# Patient Record
Sex: FEMALE | Race: WHITE | NOT HISPANIC OR LATINO | Employment: UNEMPLOYED | URBAN - METROPOLITAN AREA
[De-identification: names, ages, dates, MRNs, and addresses within clinical notes are randomized per-mention and may not be internally consistent; named-entity substitution may affect disease eponyms.]

---

## 2019-10-15 ENCOUNTER — OFFICE VISIT (OUTPATIENT)
Dept: INTERNAL MEDICINE | Facility: CLINIC | Age: 59
End: 2019-10-15

## 2019-10-15 VITALS
DIASTOLIC BLOOD PRESSURE: 104 MMHG | HEART RATE: 87 BPM | BODY MASS INDEX: 23.22 KG/M2 | SYSTOLIC BLOOD PRESSURE: 180 MMHG | RESPIRATION RATE: 16 BRPM | OXYGEN SATURATION: 98 % | HEIGHT: 61 IN | WEIGHT: 123 LBS

## 2019-10-15 DIAGNOSIS — Z11.59 ENCOUNTER FOR HEPATITIS C SCREENING TEST FOR LOW RISK PATIENT: ICD-10-CM

## 2019-10-15 DIAGNOSIS — C50.919 MALIGNANT NEOPLASM OF BREAST IN FEMALE, ESTROGEN RECEPTOR NEGATIVE, UNSPECIFIED LATERALITY, UNSPECIFIED SITE OF BREAST (HCC): ICD-10-CM

## 2019-10-15 DIAGNOSIS — Z17.1 MALIGNANT NEOPLASM OF BREAST IN FEMALE, ESTROGEN RECEPTOR NEGATIVE, UNSPECIFIED LATERALITY, UNSPECIFIED SITE OF BREAST (HCC): ICD-10-CM

## 2019-10-15 DIAGNOSIS — R39.11 URINARY HESITANCY: Primary | ICD-10-CM

## 2019-10-15 DIAGNOSIS — R03.0 WHITE COAT SYNDROME WITHOUT DIAGNOSIS OF HYPERTENSION: ICD-10-CM

## 2019-10-15 LAB
ALBUMIN SERPL-MCNC: 5 G/DL (ref 3.5–5.2)
ALBUMIN/GLOB SERPL: 1.7 G/DL
ALP SERPL-CCNC: 101 U/L (ref 39–117)
ALT SERPL W P-5'-P-CCNC: 28 U/L (ref 1–33)
ANION GAP SERPL CALCULATED.3IONS-SCNC: 17.1 MMOL/L (ref 5–15)
AST SERPL-CCNC: 19 U/L (ref 1–32)
BILIRUB BLD-MCNC: NEGATIVE MG/DL
BILIRUB SERPL-MCNC: 0.5 MG/DL (ref 0.2–1.2)
BUN BLD-MCNC: 14 MG/DL (ref 8–23)
BUN/CREAT SERPL: 19.4 (ref 7–25)
CALCIUM SPEC-SCNC: 9.9 MG/DL (ref 8.6–10.5)
CHLORIDE SERPL-SCNC: 100 MMOL/L (ref 98–107)
CLARITY, POC: CLEAR
CO2 SERPL-SCNC: 25.9 MMOL/L (ref 22–29)
COLOR UR: YELLOW
CREAT BLD-MCNC: 0.72 MG/DL (ref 0.57–1)
GFR SERPL CREATININE-BSD FRML MDRD: 82 ML/MIN/1.73
GLOBULIN UR ELPH-MCNC: 2.9 GM/DL
GLUCOSE BLD-MCNC: 91 MG/DL (ref 65–99)
GLUCOSE UR STRIP-MCNC: NEGATIVE MG/DL
HCV AB SER DONR QL: NORMAL
KETONES UR QL: NEGATIVE
LEUKOCYTE EST, POC: NEGATIVE
NITRITE UR-MCNC: NEGATIVE MG/ML
PH UR: 5 [PH] (ref 5–8)
POTASSIUM BLD-SCNC: 4.3 MMOL/L (ref 3.5–5.2)
PROT SERPL-MCNC: 7.9 G/DL (ref 6–8.5)
PROT UR STRIP-MCNC: NEGATIVE MG/DL
RBC # UR STRIP: NEGATIVE /UL
SODIUM BLD-SCNC: 143 MMOL/L (ref 136–145)
SP GR UR: 1.01 (ref 1–1.03)
UROBILINOGEN UR QL: NORMAL

## 2019-10-15 PROCEDURE — 86803 HEPATITIS C AB TEST: CPT | Performed by: INTERNAL MEDICINE

## 2019-10-15 PROCEDURE — 81003 URINALYSIS AUTO W/O SCOPE: CPT | Performed by: INTERNAL MEDICINE

## 2019-10-15 PROCEDURE — 99204 OFFICE O/P NEW MOD 45 MIN: CPT | Performed by: INTERNAL MEDICINE

## 2019-10-15 PROCEDURE — 80053 COMPREHEN METABOLIC PANEL: CPT | Performed by: INTERNAL MEDICINE

## 2019-10-15 RX ORDER — INFLUENZA A VIRUS A/MICHIGAN/45/2015 (H1N1) RECOMBINANT HEMAGGLUTININ ANTIGEN, INFLUENZA A VIRUS A/SINGAPORE/INFIMH-16-0019/2016 (H3N2) RECOMBINANT HEMAGGLUTININ ANTIGEN, INFLUENZA B VIRUS B/MARYLAND/15/2016 RECOMBINANT HEMAGGLUTININ ANTIGEN, AND INFLUENZA B VIRUS B/PHUKET/3073/2013 RECOMBINANT HEMAGGLUTININ ANTIGEN 45; 45; 45; 45 UG/.5ML; UG/.5ML; UG/.5ML; UG/.5ML
INJECTION INTRAMUSCULAR
COMMUNITY
Start: 2019-10-09 | End: 2019-10-15

## 2019-10-15 NOTE — PROGRESS NOTES
"Internal Medicine New Patient  Annie Oseguera is a 59 y.o. female who presents today to establish care and with concerns as outlined below.    Chief Complaint  Chief Complaint   Patient presents with   • Establish Care   • Difficulty Urinating     x 1month, no pain no burning        HPI  Ms. Oseguera presents today to establish care. She reports previously being seen by Mary Beth Muñiz at , had labs about a year ago.    Triple negative breast cancer in 2018 with lumpectomy, chemotherapy and radiation. She follows with Dr. Brandt at . Has lymphedema in her left hand. Has been taught exercises to manage this. Also has mild neuropathy in her left foot.    She notes previously that she has had normal blood pressure. About 6 months ago she was at her radiation appt and her blood pressure was elevated there. Also was elevated when at Dr. Brandt office. Following this she monitored her blood pressure at home for a week and it was always 120/70. She denies chest pain, vision changes, lightheadedness, dizziness, SOA.    She notes 1 month of urinary difficulty. She describes it as a \"catch\" upon initiation of urination. She has to push a slight bit more than previously but once she starts she denies weak stream, incomplete emptying. Does report lack of sensation that she needs to urinate at night but does have nocturia. Denies this being an issue during the day. No dysuria, back pain, hematuria, urinary frequency, incontinence, urgency. Reports adequate hydration. Has a history of prolapse bladder that was previously evaluated after her hysterectomy in early 2000s and she opted to defer surgery at that time.         Review of Systems  Review of Systems   Constitutional: Negative.    HENT: Negative.    Eyes: Negative.    Respiratory: Negative.    Cardiovascular: Negative.    Gastrointestinal: Negative.    Genitourinary: Positive for difficulty urinating and urinary incontinence (chronic stress incontinence). Negative for " "decreased urine volume, dysuria, flank pain, frequency, hematuria, pelvic pain, pelvic pressure and urgency.   Musculoskeletal: Negative.    Skin: Negative.    Neurological: Negative.    Hematological:        Lymphedema     Psychiatric/Behavioral: Negative.         Past Medical History  Past Medical History:   Diagnosis Date   • Cancer (CMS/HCC)     breast        Surgical History  Past Surgical History:   Procedure Laterality Date   • BREAST LUMPECTOMY Left 2018   • BREAST SURGERY      biliateral   • HYSTERECTOMY          Family History  Family History   Problem Relation Age of Onset   • Breast cancer Mother 55   • Breast cancer Maternal Great-Grandmother 55   • Uterine cancer Half-Sister 73        related via dad        Social History  Social History     Socioeconomic History   • Marital status:      Spouse name: Not on file   • Number of children: Not on file   • Years of education: Not on file   • Highest education level: Not on file   Tobacco Use   • Smoking status: Former Smoker     Packs/day: 0.50     Years: 1.00     Pack years: 0.50     Types: Cigarettes     Last attempt to quit:      Years since quittin.8   • Smokeless tobacco: Never Used   Substance and Sexual Activity   • Alcohol use: Yes     Alcohol/week: 8.4 oz     Types: 14 Glasses of wine per week     Comment: 2 nightly   • Drug use: No   • Sexual activity: Yes        Current Medications  Current Outpatient Medications on File Prior to Visit   Medication Sig Dispense Refill   • [DISCONTINUED] FLUBLOK QUADRIVALENT 0.5 ML solution prefilled syringe IM suspension        No current facility-administered medications on file prior to visit.        Allergies  Allergies   Allergen Reactions   • Sulfa Antibiotics Shortness Of Breath and Rash   • Penicillins Hives        Objective  Visit Vitals  BP (!) 180/104   Pulse 87   Resp 16   Ht 154.9 cm (61\")   Wt 55.8 kg (123 lb)   SpO2 98%   BMI 23.24 kg/m²        Physical Exam  Physical Exam "   Constitutional: She is oriented to person, place, and time. She appears well-developed and well-nourished. No distress.   HENT:   Head: Normocephalic and atraumatic.   Right Ear: External ear normal.   Left Ear: External ear normal.   Nose: Nose normal.   Mouth/Throat: Oropharynx is clear and moist. No oropharyngeal exudate.   Eyes: Conjunctivae and EOM are normal. Pupils are equal, round, and reactive to light. No scleral icterus.   Neck: Neck supple. No thyromegaly present.   Cardiovascular: Normal rate, regular rhythm, normal heart sounds and intact distal pulses.   No murmur heard.  Pulmonary/Chest: Effort normal and breath sounds normal. No respiratory distress.   Abdominal: Soft. Bowel sounds are normal. She exhibits no distension. There is no tenderness. There is no CVA tenderness.   Musculoskeletal: She exhibits edema (minimal edema of LUE). She exhibits no deformity.   Lymphadenopathy:     She has no cervical adenopathy.   Neurological: She is alert and oriented to person, place, and time. She has normal strength. No cranial nerve deficit or sensory deficit. She exhibits normal muscle tone.   Skin: Skin is warm and dry. No rash noted. She is not diaphoretic.   Psychiatric: She has a normal mood and affect. Her behavior is normal. Judgment and thought content normal.   Nursing note and vitals reviewed.       Results  No results found for this or any previous visit.     Assessment and Plan  Annie was seen today for establish care and difficulty urinating.    Diagnoses and all orders for this visit:    Urinary hesitancy  - Difficulty initiating urination without associated retention, dysuria, frequency, hesitancy. Has chronic stress incontinence.  - No signs or symptoms to suggest neurologic etiology  - UA normal  - Has history of organ prolapse after hysterectomy that has not been surgically corrected, suspect this as possible cause of her urinary difficulty  - Will obtain CMP to check kidney function  -  Referral placed to urology for further evaluation and possible surgical fixation of prolapse if felt to be contributing    Malignant neoplasm of breast in female, estrogen receptor negative, unspecified laterality, unspecified site of breast (CMS/HCC)  - s/p lumpectomy, chemotherapy and radiation  - Follows with Dr. Mallory Brandt at     White coat syndrome without diagnosis of hypertension  - BP elevated in office today, also in oncologist and radiation medicine physician office as well per patient  - BP when checked at home consistently 120/70  - She notes feeling anxious but denies chest pain, blurred vision, headache, SOA  - Advised her to continue to monitor at home and call the office if consistently above 160/100    Encounter for hepatitis C screening test for low risk patient  - HCV ab ordered    Health Maintenance   Topic Date Due   • MAMMOGRAM  1960   • ANNUAL PHYSICAL  09/30/1961   • TDAP/TD VACCINES (1 - Tdap) 09/30/1977   • ZOSTER VACCINE (1 of 2) 09/30/2008   • HEPATITIS C SCREENING  10/15/2019   • PAP SMEAR  10/15/2019   • COLONOSCOPY  10/15/2019   • INFLUENZA VACCINE  Completed     Health Maintenance  - Pap smear: s/p hysterectomy for fibroids. Reports normal pap smear 2017 by Dr. Pathak. Records requested.  - Mammogram: Last 1/2019, BIRADS 1.  - Colonoscopy: Last 2009, normal per patient report. Have requested records. If no polyps will proceed with cologuard at next visit per patient preference.  - Immunizations: Flu UTD. Unsure of tdap. Has completed hep A series. Counseled on shingrix today, she will call her insurance to determine coverage.  - Depression screening: Negative 10/2019    Reports recent screening labs at . Will request these records, deferred screening labs today.    Return in about 3 months (around 1/15/2020) for Follow up urinary difficulty.    Addendum:  Requested record of prior colonoscopy from [facility] but no colonoscopy found.   Requested record of pap smear from  Dr. Pathak's office but noted to have not been seen since 2016 and only records sent were from 2012 regarding her hysterectomy.

## 2020-02-11 ENCOUNTER — OFFICE VISIT (OUTPATIENT)
Dept: INTERNAL MEDICINE | Facility: CLINIC | Age: 60
End: 2020-02-11

## 2020-02-11 VITALS
DIASTOLIC BLOOD PRESSURE: 94 MMHG | BODY MASS INDEX: 22.84 KG/M2 | HEIGHT: 61 IN | HEART RATE: 68 BPM | WEIGHT: 121 LBS | SYSTOLIC BLOOD PRESSURE: 154 MMHG

## 2020-02-11 DIAGNOSIS — I10 ESSENTIAL HYPERTENSION: ICD-10-CM

## 2020-02-11 DIAGNOSIS — Z12.11 SCREEN FOR COLON CANCER: Primary | ICD-10-CM

## 2020-02-11 PROBLEM — R03.0 WHITE COAT SYNDROME WITHOUT DIAGNOSIS OF HYPERTENSION: Status: RESOLVED | Noted: 2019-10-15 | Resolved: 2020-02-11

## 2020-02-11 LAB
ALBUMIN SERPL-MCNC: 4.1 G/DL (ref 3.5–5.2)
ALBUMIN/GLOB SERPL: 1.3 G/DL
ALP SERPL-CCNC: 141 U/L (ref 39–117)
ALT SERPL W P-5'-P-CCNC: 96 U/L (ref 1–33)
ANION GAP SERPL CALCULATED.3IONS-SCNC: 13.3 MMOL/L (ref 5–15)
AST SERPL-CCNC: 37 U/L (ref 1–32)
BILIRUB SERPL-MCNC: 0.4 MG/DL (ref 0.2–1.2)
BUN BLD-MCNC: 13 MG/DL (ref 8–23)
BUN/CREAT SERPL: 15.5 (ref 7–25)
CALCIUM SPEC-SCNC: 9.7 MG/DL (ref 8.6–10.5)
CHLORIDE SERPL-SCNC: 102 MMOL/L (ref 98–107)
CHOLEST SERPL-MCNC: 228 MG/DL (ref 0–200)
CO2 SERPL-SCNC: 24.7 MMOL/L (ref 22–29)
CREAT BLD-MCNC: 0.84 MG/DL (ref 0.57–1)
DEPRECATED RDW RBC AUTO: 42.8 FL (ref 37–54)
ERYTHROCYTE [DISTWIDTH] IN BLOOD BY AUTOMATED COUNT: 12.6 % (ref 12.3–15.4)
GFR SERPL CREATININE-BSD FRML MDRD: 69 ML/MIN/1.73
GLOBULIN UR ELPH-MCNC: 3.1 GM/DL
GLUCOSE BLD-MCNC: 101 MG/DL (ref 65–99)
HCT VFR BLD AUTO: 43 % (ref 34–46.6)
HDLC SERPL-MCNC: 81 MG/DL (ref 40–60)
HGB BLD-MCNC: 14.4 G/DL (ref 12–15.9)
LDLC SERPL CALC-MCNC: 131 MG/DL (ref 0–100)
LDLC/HDLC SERPL: 1.61 {RATIO}
MCH RBC QN AUTO: 31.4 PG (ref 26.6–33)
MCHC RBC AUTO-ENTMCNC: 33.5 G/DL (ref 31.5–35.7)
MCV RBC AUTO: 93.7 FL (ref 79–97)
PLATELET # BLD AUTO: 258 10*3/MM3 (ref 140–450)
PMV BLD AUTO: 9.5 FL (ref 6–12)
POTASSIUM BLD-SCNC: 5.4 MMOL/L (ref 3.5–5.2)
PROT SERPL-MCNC: 7.2 G/DL (ref 6–8.5)
RBC # BLD AUTO: 4.59 10*6/MM3 (ref 3.77–5.28)
SODIUM BLD-SCNC: 140 MMOL/L (ref 136–145)
TRIGL SERPL-MCNC: 82 MG/DL (ref 0–150)
TSH SERPL DL<=0.05 MIU/L-ACNC: 5.12 UIU/ML (ref 0.27–4.2)
VLDLC SERPL-MCNC: 16.4 MG/DL (ref 5–40)
WBC NRBC COR # BLD: 6.51 10*3/MM3 (ref 3.4–10.8)

## 2020-02-11 PROCEDURE — 80061 LIPID PANEL: CPT | Performed by: INTERNAL MEDICINE

## 2020-02-11 PROCEDURE — 99213 OFFICE O/P EST LOW 20 MIN: CPT | Performed by: INTERNAL MEDICINE

## 2020-02-11 PROCEDURE — 85027 COMPLETE CBC AUTOMATED: CPT | Performed by: INTERNAL MEDICINE

## 2020-02-11 PROCEDURE — 84443 ASSAY THYROID STIM HORMONE: CPT | Performed by: INTERNAL MEDICINE

## 2020-02-11 PROCEDURE — 80053 COMPREHEN METABOLIC PANEL: CPT | Performed by: INTERNAL MEDICINE

## 2020-02-11 RX ORDER — LISINOPRIL 10 MG/1
10 TABLET ORAL NIGHTLY
Qty: 30 TABLET | Refills: 5 | Status: SHIPPED | OUTPATIENT
Start: 2020-02-11 | End: 2021-02-04

## 2020-03-09 PROBLEM — K57.30 SIGMOID DIVERTICULOSIS: Status: ACTIVE | Noted: 2020-03-09

## 2020-08-12 ENCOUNTER — OFFICE VISIT (OUTPATIENT)
Dept: INTERNAL MEDICINE | Facility: CLINIC | Age: 60
End: 2020-08-12

## 2020-08-12 ENCOUNTER — LAB (OUTPATIENT)
Dept: LAB | Facility: HOSPITAL | Age: 60
End: 2020-08-12

## 2020-08-12 VITALS
HEIGHT: 61 IN | DIASTOLIC BLOOD PRESSURE: 76 MMHG | HEART RATE: 76 BPM | WEIGHT: 124 LBS | SYSTOLIC BLOOD PRESSURE: 120 MMHG | BODY MASS INDEX: 23.41 KG/M2 | TEMPERATURE: 96.6 F

## 2020-08-12 DIAGNOSIS — K57.30 SIGMOID DIVERTICULOSIS: ICD-10-CM

## 2020-08-12 DIAGNOSIS — I10 ESSENTIAL HYPERTENSION: Primary | ICD-10-CM

## 2020-08-12 LAB
ALBUMIN SERPL-MCNC: 4.4 G/DL (ref 3.5–5.2)
ALBUMIN/GLOB SERPL: 1.8 G/DL
ALP SERPL-CCNC: 84 U/L (ref 39–117)
ALT SERPL W P-5'-P-CCNC: 26 U/L (ref 1–33)
ANION GAP SERPL CALCULATED.3IONS-SCNC: 13.4 MMOL/L (ref 5–15)
AST SERPL-CCNC: 22 U/L (ref 1–32)
BILIRUB SERPL-MCNC: 0.3 MG/DL (ref 0–1.2)
BUN SERPL-MCNC: 19 MG/DL (ref 8–23)
BUN/CREAT SERPL: 22.4 (ref 7–25)
CALCIUM SPEC-SCNC: 10.2 MG/DL (ref 8.6–10.5)
CHLORIDE SERPL-SCNC: 103 MMOL/L (ref 98–107)
CO2 SERPL-SCNC: 26.6 MMOL/L (ref 22–29)
CREAT SERPL-MCNC: 0.85 MG/DL (ref 0.57–1)
DEPRECATED RDW RBC AUTO: 47.6 FL (ref 37–54)
ERYTHROCYTE [DISTWIDTH] IN BLOOD BY AUTOMATED COUNT: 13.8 % (ref 12.3–15.4)
GFR SERPL CREATININE-BSD FRML MDRD: 68 ML/MIN/1.73
GLOBULIN UR ELPH-MCNC: 2.4 GM/DL
GLUCOSE SERPL-MCNC: 102 MG/DL (ref 65–99)
HCT VFR BLD AUTO: 43.8 % (ref 34–46.6)
HGB BLD-MCNC: 15 G/DL (ref 12–15.9)
MCH RBC QN AUTO: 32.2 PG (ref 26.6–33)
MCHC RBC AUTO-ENTMCNC: 34.2 G/DL (ref 31.5–35.7)
MCV RBC AUTO: 94 FL (ref 79–97)
PLATELET # BLD AUTO: 272 10*3/MM3 (ref 140–450)
PMV BLD AUTO: 9.3 FL (ref 6–12)
POTASSIUM SERPL-SCNC: 3.9 MMOL/L (ref 3.5–5.2)
PROT SERPL-MCNC: 6.8 G/DL (ref 6–8.5)
RBC # BLD AUTO: 4.66 10*6/MM3 (ref 3.77–5.28)
SODIUM SERPL-SCNC: 143 MMOL/L (ref 136–145)
WBC # BLD AUTO: 7.39 10*3/MM3 (ref 3.4–10.8)

## 2020-08-12 PROCEDURE — 80053 COMPREHEN METABOLIC PANEL: CPT | Performed by: INTERNAL MEDICINE

## 2020-08-12 PROCEDURE — 85027 COMPLETE CBC AUTOMATED: CPT | Performed by: INTERNAL MEDICINE

## 2020-08-12 PROCEDURE — 99213 OFFICE O/P EST LOW 20 MIN: CPT | Performed by: INTERNAL MEDICINE

## 2020-08-12 NOTE — PROGRESS NOTES
Patient is a 59 y.o. female who is here for a follow up of hypertension.  Chief Complaint   Patient presents with   • Hypertension         HPI:    Here for mgmt of HTN.  Last visit was started on lisinopril.  No cough or SOB or edema.  BP readings are good at home.  Exercising without any issues.      History:     Patient Active Problem List   Diagnosis   • Urinary hesitancy   • Malignant neoplasm of breast in female, estrogen receptor negative (CMS/HCC)   • Essential hypertension   • Sigmoid diverticulosis       Past Medical History:   Diagnosis Date   • Cancer (CMS/HCC)     breast       Past Surgical History:   Procedure Laterality Date   • BREAST LUMPECTOMY Left    • BREAST SURGERY      biliateral   • HYSTERECTOMY         Current Outpatient Medications on File Prior to Visit   Medication Sig   • lisinopril (PRINIVIL,ZESTRIL) 10 MG tablet Take 1 tablet by mouth Every Night.     No current facility-administered medications on file prior to visit.        Family History   Problem Relation Age of Onset   • Breast cancer Mother 55   • Breast cancer Maternal Great-Grandmother 55   • Uterine cancer Half-Sister 73        related via dad       Social History     Socioeconomic History   • Marital status:      Spouse name: Not on file   • Number of children: Not on file   • Years of education: Not on file   • Highest education level: Not on file   Tobacco Use   • Smoking status: Former Smoker     Packs/day: 0.50     Years: 1.00     Pack years: 0.50     Types: Cigarettes     Last attempt to quit:      Years since quittin.6   • Smokeless tobacco: Never Used   Substance and Sexual Activity   • Alcohol use: Yes     Alcohol/week: 14.0 standard drinks     Types: 14 Glasses of wine per week     Comment: 2 nightly   • Drug use: No   • Sexual activity: Yes         Review of Systems   Constitutional: Negative for chills, fever and unexpected weight change.   HENT: Negative for congestion, ear pain, hearing  "loss, rhinorrhea, sinus pressure, sore throat and trouble swallowing.    Eyes: Negative for discharge and itching.   Respiratory: Negative for cough, chest tightness and shortness of breath.    Cardiovascular: Negative for chest pain, palpitations and leg swelling.   Gastrointestinal: Negative for abdominal pain, blood in stool, constipation, diarrhea and vomiting.        3/20 colonoscopy with Dr Roman, no polyps   Endocrine: Negative for polydipsia and polyuria.   Genitourinary: Negative for dysuria, enuresis, frequency, hematuria and urgency.   Musculoskeletal: Negative for arthralgias, back pain, gait problem and joint swelling.   Skin: Negative for rash and wound.   Allergic/Immunologic: Negative for immunocompromised state.   Neurological: Negative for dizziness, syncope, weakness, light-headedness, numbness and headaches.   Hematological: Does not bruise/bleed easily.   Psychiatric/Behavioral: Negative for behavioral problems, dysphoric mood and sleep disturbance. The patient is not nervous/anxious.        /76   Pulse 76   Temp 96.6 °F (35.9 °C) (Infrared)   Ht 154.9 cm (60.98\")   Wt 56.2 kg (124 lb)   BMI 23.44 kg/m²       Physical Exam   Constitutional: She is oriented to person, place, and time. She appears well-developed and well-nourished.   HENT:   Head: Normocephalic and atraumatic.   Right Ear: External ear normal.   Left Ear: External ear normal.   Mouth/Throat: Oropharynx is clear and moist.   Eyes: Conjunctivae and EOM are normal.   Neck: Normal range of motion. Neck supple.   Cardiovascular: Normal rate, regular rhythm and normal heart sounds.   Pulmonary/Chest: Effort normal and breath sounds normal.   Abdominal: Soft. Bowel sounds are normal.   Musculoskeletal: Normal range of motion.   Lymphadenopathy:     She has no cervical adenopathy.   Neurological: She is alert and oriented to person, place, and time.   Skin: Skin is warm and dry.   Psychiatric: She has a normal mood and affect. " Her behavior is normal. Thought content normal.       Procedure:      Discussion/Summary:    HTN-cont lisinopril at 1/2 dose  Diverticulosis-counseled on fiber   High risk meds-labs today dw patient    8/12 labs noted and dw patient    Current Outpatient Medications:   •  lisinopril (PRINIVIL,ZESTRIL) 10 MG tablet, Take 1 tablet by mouth Every Night., Disp: 30 tablet, Rfl: 5        Annie was seen today for hypertension.    Diagnoses and all orders for this visit:    Essential hypertension  -     CBC (No Diff)  -     Comprehensive Metabolic Panel    Sigmoid diverticulosis

## 2021-02-04 DIAGNOSIS — I10 ESSENTIAL HYPERTENSION: ICD-10-CM

## 2021-02-04 RX ORDER — LISINOPRIL 10 MG/1
TABLET ORAL
Qty: 90 TABLET | Refills: 3 | Status: SHIPPED | OUTPATIENT
Start: 2021-02-04 | End: 2022-02-14 | Stop reason: SDUPTHER

## 2022-02-14 ENCOUNTER — OFFICE VISIT (OUTPATIENT)
Dept: INTERNAL MEDICINE | Facility: CLINIC | Age: 62
End: 2022-02-14

## 2022-02-14 VITALS
TEMPERATURE: 96.9 F | BODY MASS INDEX: 24.73 KG/M2 | HEART RATE: 80 BPM | OXYGEN SATURATION: 99 % | WEIGHT: 131 LBS | HEIGHT: 61 IN | SYSTOLIC BLOOD PRESSURE: 136 MMHG | DIASTOLIC BLOOD PRESSURE: 70 MMHG

## 2022-02-14 DIAGNOSIS — K21.9 GASTROESOPHAGEAL REFLUX DISEASE WITHOUT ESOPHAGITIS: Primary | ICD-10-CM

## 2022-02-14 DIAGNOSIS — I10 ESSENTIAL HYPERTENSION: ICD-10-CM

## 2022-02-14 PROCEDURE — 99214 OFFICE O/P EST MOD 30 MIN: CPT | Performed by: INTERNAL MEDICINE

## 2022-02-14 RX ORDER — ESTRADIOL 10 UG/1
1 INSERT VAGINAL 2 TIMES WEEKLY
COMMUNITY
Start: 2021-12-20 | End: 2022-08-24 | Stop reason: ALTCHOICE

## 2022-02-14 RX ORDER — LISINOPRIL 10 MG/1
5 TABLET ORAL DAILY
Qty: 45 TABLET | Refills: 1 | Status: SHIPPED | OUTPATIENT
Start: 2022-02-14 | End: 2022-08-16

## 2022-02-14 RX ORDER — OMEPRAZOLE 20 MG/1
20 CAPSULE, DELAYED RELEASE ORAL DAILY
COMMUNITY
End: 2022-02-14 | Stop reason: SDUPTHER

## 2022-02-14 RX ORDER — VITAMIN B COMPLEX
1 CAPSULE ORAL DAILY
COMMUNITY

## 2022-02-14 RX ORDER — OMEPRAZOLE 20 MG/1
20 CAPSULE, DELAYED RELEASE ORAL DAILY
Qty: 90 CAPSULE | Refills: 1 | Status: SHIPPED | OUTPATIENT
Start: 2022-02-14 | End: 2022-08-24 | Stop reason: SDUPTHER

## 2022-02-14 RX ORDER — CHLORAL HYDRATE 500 MG
2800 CAPSULE ORAL DAILY
COMMUNITY

## 2022-02-14 NOTE — PROGRESS NOTES
Chief Complaint  Hypertension (discuss stopping BP medication, discuss how do you know it is under control or not), Heartburn (3 months ago started having acid reflux, started OTC omeprazole and stopped 2 weeks after starting sx came back and restarted wanted to disucss how long to take), Breast Mass (had breast cancer in 2018, lump was found in other breast and will be having a biopsy), and Establish Care (new patient)    Ralf Oseguera presents to [facility]  History of Present Illness    New pt here to establish.  She had seen Dr. Ocampo in the past.  H/o:    Hypertension-she is currently on lisinopril 10 mg 1/2 qd, and has been on this for several years. She checks BP and usually gets in the 110s/70s range, but has been higher in last week as she has been off the breast biopsy..   She exercises regularly, and does low salt diet.  She is wondering if she needs it.  Last labs were 12/20  No h/o hyperlipidemia or heart disease    Breast cancer-triple negative on left diagnosed in 2018.  She had chemotherapy and radiation therapy.  Recently breast mass seen on the right  and is scheduled for biopsy at . She sees  ARNP- GYN as well.  She wants to do hormone therapy as she has done some research and feels like it might be safe even with her history of breast cancer but so far her gynecologist and hematologist/oncologist have not recommended it    GERD-on Prilosec OTC. Started having gerd about 3 months usually at night or if she leans over when she is exercising. She initially tool it for 2 weeks and was better for a few weeks but then started back again.  Hot tea in morning, but no other caffeine. Not a lot of spicy foods.   She has already elevated HOB    H/o diverticulosis- saw Dr Patel for her colon. Doing better w/ probiotic and Metamucil-regular bowel movements        Current Outpatient Medications:   •  Ascorbic Acid (VITAMIN C PO), Take 1 each by mouth  "Daily. Lipisomal, Disp: , Rfl:   •  B Complex Vitamins (vitamin b complex) capsule capsule, Take 1 capsule by mouth Daily., Disp: , Rfl:   •  cholecalciferol (VITAMIN D3) 1.25 MG (55110 UT) capsule, 2,000 Units Daily., Disp: , Rfl:   •  COLLAGEN PO, Take 3 capsules by mouth Daily. Marine collagen, Disp: , Rfl:   •  estradiol (VAGIFEM) 10 MCG tablet vaginal tablet, 1 tablet 2 (Two) Times a Week., Disp: , Rfl:   •  lisinopril (PRINIVIL,ZESTRIL) 10 MG tablet, Take 0.5 tablets by mouth Daily., Disp: 45 tablet, Rfl: 1  •  MAGNESIUM PO, Take 250 mg by mouth Daily., Disp: , Rfl:   •  Methylcellulose, Laxative, (CITRUCEL PO), Take 2 tablets by mouth Every Night., Disp: , Rfl:   •  MILK THISTLE PO, Take 350 mg by mouth Daily., Disp: , Rfl:   •  Omega-3 Fatty Acids (fish oil) 1000 MG capsule capsule, 1 capsule Daily., Disp: , Rfl:   •  omeprazole (priLOSEC) 20 MG capsule, Take 1 capsule by mouth Daily., Disp: 90 capsule, Rfl: 1  •  Probiotic Product (PROBIOTIC DAILY PO), 1 capsule Daily., Disp: , Rfl:       Objective   Vital Signs:   /70 (BP Location: Right arm, Patient Position: Sitting)   Pulse 80   Temp 96.9 °F (36.1 °C) (Infrared)   Ht 155.7 cm (61.3\")   Wt 59.4 kg (131 lb)   SpO2 99%   BMI 24.51 kg/m²       Physical exam  Constitutional: oriented to person, place, and time.  well-developed and well-nourished. No distress.   HENT:   Head: Normocephalic and atraumatic.   Eyes: Conjunctivae and EOM are normal.   Cardiovascular: Normal rate, regular rhythm and normal heart sounds.  Exam reveals no gallop and no friction rub.    No murmur heard.  Pulmonary/Chest: Effort normal and breath sounds normal. No respiratory distress.   no wheezes.   Neurological:  alert and oriented to person, place, and time.   Skin: Skin is warm and dry. not diaphoretic.   Psychiatric:  normal mood and affect. behavior is normal. Judgment and thought content normal.      Result Review :         Data reviewed: Radiologic studies donna " from [facility] and Consultant notes hem/onc note,           Assessment and Plan    Diagnoses and all orders for this visit:    1. Gastroesophageal reflux disease without esophagitis (Primary)-reflux precautions reviewed and we will send in prescription Prilosec for cost reasons.  She might try to do it every other day and see how she does.  No red flag symptoms as far as need for endoscopy  -     omeprazole (priLOSEC) 20 MG capsule; Take 1 capsule by mouth Daily.  Dispense: 90 capsule; Refill: 1    2. Essential hypertension-good control with low-dose lisinopril.  Encouraged her to remain on this.  We will check blood work.  She does not want to do today but will return for this.  -     lisinopril (PRINIVIL,ZESTRIL) 10 MG tablet; Take 0.5 tablets by mouth Daily.  Dispense: 45 tablet; Refill: 1  -     CBC (No Diff); Future  -     Comprehensive Metabolic Panel; Future        Follow Up   Return in about 6 months (around 8/14/2022) for Annual.  Patient was given instructions and counseling regarding her condition or for health maintenance advice. Please see specific information pulled into the AVS if appropriate.     Preventative-colonoscopy was done in 3/20 with , repeat in 10 yrs  Mammogram-just had at  and is scheduled for biopsy  She had COVID booster and flu shot  dexa- she has never had- we will have her schedule a physical and do then  shingrix- she has not had. She has had shingleds

## 2022-03-09 ENCOUNTER — LAB (OUTPATIENT)
Dept: LAB | Facility: HOSPITAL | Age: 62
End: 2022-03-09

## 2022-03-09 DIAGNOSIS — I10 ESSENTIAL HYPERTENSION: ICD-10-CM

## 2022-03-09 LAB
ALBUMIN SERPL-MCNC: 4.4 G/DL (ref 3.5–5.2)
ALBUMIN/GLOB SERPL: 1.6 G/DL
ALP SERPL-CCNC: 89 U/L (ref 39–117)
ALT SERPL W P-5'-P-CCNC: 22 U/L (ref 1–33)
ANION GAP SERPL CALCULATED.3IONS-SCNC: 13.2 MMOL/L (ref 5–15)
AST SERPL-CCNC: 21 U/L (ref 1–32)
BILIRUB SERPL-MCNC: 0.3 MG/DL (ref 0–1.2)
BUN SERPL-MCNC: 13 MG/DL (ref 8–23)
BUN/CREAT SERPL: 14.1 (ref 7–25)
CALCIUM SPEC-SCNC: 9.5 MG/DL (ref 8.6–10.5)
CHLORIDE SERPL-SCNC: 105 MMOL/L (ref 98–107)
CO2 SERPL-SCNC: 23.8 MMOL/L (ref 22–29)
CREAT SERPL-MCNC: 0.92 MG/DL (ref 0.57–1)
DEPRECATED RDW RBC AUTO: 47.1 FL (ref 37–54)
EGFRCR SERPLBLD CKD-EPI 2021: 70.1 ML/MIN/1.73
ERYTHROCYTE [DISTWIDTH] IN BLOOD BY AUTOMATED COUNT: 13.2 % (ref 12.3–15.4)
GLOBULIN UR ELPH-MCNC: 2.7 GM/DL
GLUCOSE SERPL-MCNC: 105 MG/DL (ref 65–99)
HCT VFR BLD AUTO: 45.4 % (ref 34–46.6)
HGB BLD-MCNC: 14.8 G/DL (ref 12–15.9)
MCH RBC QN AUTO: 31.9 PG (ref 26.6–33)
MCHC RBC AUTO-ENTMCNC: 32.6 G/DL (ref 31.5–35.7)
MCV RBC AUTO: 97.8 FL (ref 79–97)
PLATELET # BLD AUTO: 252 10*3/MM3 (ref 140–450)
PMV BLD AUTO: 9.5 FL (ref 6–12)
POTASSIUM SERPL-SCNC: 4.3 MMOL/L (ref 3.5–5.2)
PROT SERPL-MCNC: 7.1 G/DL (ref 6–8.5)
RBC # BLD AUTO: 4.64 10*6/MM3 (ref 3.77–5.28)
SODIUM SERPL-SCNC: 142 MMOL/L (ref 136–145)
WBC NRBC COR # BLD: 5.65 10*3/MM3 (ref 3.4–10.8)

## 2022-03-09 PROCEDURE — 80053 COMPREHEN METABOLIC PANEL: CPT | Performed by: INTERNAL MEDICINE

## 2022-03-09 PROCEDURE — 85027 COMPLETE CBC AUTOMATED: CPT | Performed by: INTERNAL MEDICINE

## 2022-03-16 ENCOUNTER — TELEPHONE (OUTPATIENT)
Dept: INTERNAL MEDICINE | Facility: CLINIC | Age: 62
End: 2022-03-16

## 2022-03-16 NOTE — TELEPHONE ENCOUNTER
Patient has been notified that a letter was put in mail and should be receiving.  I reviewed the results with her.  Patient verbalized understanding.

## 2022-08-15 DIAGNOSIS — I10 ESSENTIAL HYPERTENSION: ICD-10-CM

## 2022-08-16 RX ORDER — LISINOPRIL 10 MG/1
TABLET ORAL
Qty: 45 TABLET | Refills: 0 | Status: SHIPPED | OUTPATIENT
Start: 2022-08-16 | End: 2022-08-24

## 2022-08-20 NOTE — PROGRESS NOTES
Here for physical    Exercise: works out w/  2d/week and walks regualrly  Diet: healthy overall; on supplements listed except the milk thistle. Is using citrucel daily    HTN - on lisinopril 10 1/2 qd. She checks BP at home and usually in the 120/80 range. In past has been high in office    GERD- on prilosec- she is doing 4 days a week and well controlled.  She plans to try to cut back further gradually    HRT - she is on estring currently and this does help with her vaginal symptoms.  She had been on Vagifem but insurance preferred the Estring.    L ear - feels something in her ear over last 6 months. No pain    Current Outpatient Medications:   •  Ascorbic Acid (VITAMIN C PO), Take 1,000 mg by mouth Daily. Lipisomal, Disp: , Rfl:   •  AzaSite 1 % ophthalmic solution, Administer 1 drop to both eyes 2 (Two) Times a Day., Disp: , Rfl:   •  B Complex Vitamins (vitamin b complex) capsule capsule, Take 1 capsule by mouth Daily., Disp: , Rfl:   •  cholecalciferol (VITAMIN D3) 1.25 MG (01101 UT) capsule, 2,000 Units Daily., Disp: , Rfl:   •  COLLAGEN PO, Take 3 capsules by mouth Daily. Marine collagen, Disp: , Rfl:   •  Estring 2 MG vaginal ring, 1 each Every 3 (Three) Months., Disp: , Rfl:   •  MAGNESIUM PO, Take 200 mg by mouth Daily., Disp: , Rfl:   •  Methylcellulose, Laxative, (CITRUCEL PO), Take 2 tablets by mouth Every Night., Disp: , Rfl:   •  Omega-3 Fatty Acids (fish oil) 1000 MG capsule capsule, 2,800 mg Daily., Disp: , Rfl:   •  omeprazole (priLOSEC) 20 MG capsule, Take 1 capsule by mouth Daily., Disp: 90 capsule, Rfl: 3  •  Probiotic Product (PROBIOTIC DAILY PO), 1 capsule Daily., Disp: , Rfl:   •  Unable to find, 2 each Daily. kim, Disp: , Rfl:   •  lisinopril (PRINIVIL,ZESTRIL) 5 MG tablet, Take 1 tablet by mouth Daily., Disp: 90 tablet, Rfl: 0    The following portions of the patient's history were reviewed and updated as appropriate: allergies, current medications, past family  "history, past medical history, past social history, past surgical history and problem list.    Review of Systems   Constitutional: Negative for activity change, appetite change, fever, unexpected weight gain and unexpected weight loss.   HENT: Negative.    Eyes: Negative.    Respiratory: Negative for shortness of breath and wheezing.    Cardiovascular: Negative for chest pain, palpitations and leg swelling.   Gastrointestinal: Positive for constipation (citrucel helps) and GERD.   Endocrine: Negative.    Genitourinary: Negative for difficulty urinating, dysuria and vaginal pain (on estring that helps). Vaginal discharge: estring working well.   Skin: Negative.    Allergic/Immunologic: Negative for immunocompromised state.   Neurological: Negative for seizures, speech difficulty, memory problem and confusion.   Hematological: Does not bruise/bleed easily.   Psychiatric/Behavioral: Negative for agitation.         Objective    /74 (BP Location: Right arm, Patient Position: Sitting)   Pulse 83   Temp 97.3 °F (36.3 °C) (Infrared)   Ht 155.7 cm (61.3\")   Wt 59.4 kg (131 lb)   SpO2 100%   BMI 24.51 kg/m²   Physical Exam   Physical Exam  Vitals and nursing note reviewed.   Constitutional:       General: She is not in acute distress.     Appearance: Normal appearance. She is well-developed. She is not diaphoretic.   HENT:      Head: Normocephalic and atraumatic.      Right Ear: Tympanic membrane, ear canal and external ear normal.      Left Ear: Tympanic membrane, ear canal and external ear normal.      Ears:      Comments: Possible cyst on tragus-nontender     Nose: Nose normal.      Mouth/Throat:      Pharynx: No oropharyngeal exudate.   Eyes:      General: No scleral icterus.        Right eye: No discharge.         Left eye: No discharge.      Conjunctiva/sclera: Conjunctivae normal.      Pupils: Pupils are equal, round, and reactive to light.   Neck:      Thyroid: No thyromegaly.   Cardiovascular:      Rate " and Rhythm: Normal rate and regular rhythm.      Heart sounds: Normal heart sounds. No murmur heard.    No friction rub. No gallop.   Pulmonary:      Effort: Pulmonary effort is normal. No respiratory distress.      Breath sounds: Normal breath sounds. No wheezing or rales.   Abdominal:      General: Bowel sounds are normal. There is no distension.      Palpations: Abdomen is soft. There is no mass.      Tenderness: There is no abdominal tenderness. There is no guarding or rebound.   Musculoskeletal:         General: No deformity. Normal range of motion.      Cervical back: Normal range of motion and neck supple.   Lymphadenopathy:      Cervical: No cervical adenopathy.   Skin:     General: Skin is warm and dry.      Coloration: Skin is not pale.      Findings: No erythema or rash.   Neurological:      Mental Status: She is alert and oriented to person, place, and time.      Coordination: Coordination normal.      Deep Tendon Reflexes: Reflexes normal.   Psychiatric:         Behavior: Behavior normal.         Thought Content: Thought content normal.         Judgment: Judgment normal.           Assessment/Plan   Diagnoses and all orders for this visit:    1. Routine general medical examination at a health care facility (Primary)  Regular exercise/healthy diet. BSE q month. Sunscreen use encouraged. calcium intake reviewed. Check fasting labs  Eve - she is scheduled for a diagnostic soon- had biopsy done 6m ago which was benign and due for her 6m f/u diagnostic  Colon due 3/30 (Dr Patel)  DT due- she is scheduled for her diagnostic eve soon so we will wait until after she has the mammogram.  She will do this when she returns for her fasting labs next week  DEXA due now (has not had)  Shingles- shingrix discussed -  can check at pharmacy since we do not have   covid vaccines - she had  Pap-she is s/p hysterectomy.  She will start doing her breast exams here and no longer sees gynecologist  -     CBC (No Diff); Future  -      TSH Rfx On Abnormal To Free T4; Future  -     Lipid Panel; Future  -     Comprehensive Metabolic Panel; Future  -     Vitamin D 25 Hydroxy; Future  -     POC Urinalysis Dipstick, Automated    2. Essential hypertension-good control on low-dose lisinopril    3. Vitamin D deficiency-check vitamin D level  -     Vitamin D 25 Hydroxy; Future    4. Gastroesophageal reflux disease without esophagitis- patient doing well with omeprazole 4 days a week and she will continue to try to wean  -     omeprazole (priLOSEC) 20 MG capsule; Take 1 capsule by mouth Daily.  Dispense: 90 capsule; Refill: 3    Other orders  -     lisinopril (PRINIVIL,ZESTRIL) 5 MG tablet; Take 1 tablet by mouth Daily.  Dispense: 90 tablet; Refill: 0

## 2022-08-24 ENCOUNTER — OFFICE VISIT (OUTPATIENT)
Dept: INTERNAL MEDICINE | Facility: CLINIC | Age: 62
End: 2022-08-24

## 2022-08-24 VITALS
SYSTOLIC BLOOD PRESSURE: 126 MMHG | DIASTOLIC BLOOD PRESSURE: 74 MMHG | BODY MASS INDEX: 24.73 KG/M2 | OXYGEN SATURATION: 100 % | TEMPERATURE: 97.3 F | HEIGHT: 61 IN | WEIGHT: 131 LBS | HEART RATE: 83 BPM

## 2022-08-24 DIAGNOSIS — Z00.00 ROUTINE GENERAL MEDICAL EXAMINATION AT A HEALTH CARE FACILITY: Primary | ICD-10-CM

## 2022-08-24 DIAGNOSIS — K21.9 GASTROESOPHAGEAL REFLUX DISEASE WITHOUT ESOPHAGITIS: ICD-10-CM

## 2022-08-24 DIAGNOSIS — I10 ESSENTIAL HYPERTENSION: ICD-10-CM

## 2022-08-24 DIAGNOSIS — E55.9 VITAMIN D DEFICIENCY: ICD-10-CM

## 2022-08-24 LAB
BILIRUB BLD-MCNC: NEGATIVE MG/DL
CLARITY, POC: CLEAR
COLOR UR: YELLOW
EXPIRATION DATE: NORMAL
GLUCOSE UR STRIP-MCNC: NEGATIVE MG/DL
KETONES UR QL: NEGATIVE
LEUKOCYTE EST, POC: NEGATIVE
Lab: NORMAL
NITRITE UR-MCNC: NEGATIVE MG/ML
PH UR: 6 [PH] (ref 5–8)
PROT UR STRIP-MCNC: NEGATIVE MG/DL
RBC # UR STRIP: NEGATIVE /UL
SP GR UR: 1.01 (ref 1–1.03)
UROBILINOGEN UR QL: NORMAL

## 2022-08-24 PROCEDURE — 81003 URINALYSIS AUTO W/O SCOPE: CPT | Performed by: INTERNAL MEDICINE

## 2022-08-24 PROCEDURE — 99396 PREV VISIT EST AGE 40-64: CPT | Performed by: INTERNAL MEDICINE

## 2022-08-24 RX ORDER — ESTRADIOL 2 MG/1
1 RING VAGINAL
COMMUNITY
Start: 2022-07-19

## 2022-08-24 RX ORDER — LISINOPRIL 5 MG/1
5 TABLET ORAL DAILY
Qty: 90 TABLET | Refills: 0
Start: 2022-08-24 | End: 2022-10-26 | Stop reason: SDUPTHER

## 2022-08-24 RX ORDER — OMEPRAZOLE 20 MG/1
20 CAPSULE, DELAYED RELEASE ORAL DAILY
Qty: 90 CAPSULE | Refills: 3 | Status: SHIPPED | OUTPATIENT
Start: 2022-08-24

## 2022-08-24 RX ORDER — AZITHROMYCIN MONOHYDRATE 10 MG/ML
1 SOLUTION/ DROPS OPHTHALMIC 2 TIMES DAILY
COMMUNITY
Start: 2022-08-04

## 2022-09-15 ENCOUNTER — LAB (OUTPATIENT)
Dept: LAB | Facility: HOSPITAL | Age: 62
End: 2022-09-15

## 2022-09-15 ENCOUNTER — APPOINTMENT (OUTPATIENT)
Dept: LAB | Facility: HOSPITAL | Age: 62
End: 2022-09-15

## 2022-09-15 DIAGNOSIS — E55.9 VITAMIN D DEFICIENCY: ICD-10-CM

## 2022-09-15 DIAGNOSIS — Z00.00 ROUTINE GENERAL MEDICAL EXAMINATION AT A HEALTH CARE FACILITY: ICD-10-CM

## 2022-09-15 LAB
25(OH)D3 SERPL-MCNC: 82.1 NG/ML (ref 30–100)
ALBUMIN SERPL-MCNC: 4.5 G/DL (ref 3.5–5.2)
ALBUMIN/GLOB SERPL: 1.7 G/DL
ALP SERPL-CCNC: 93 U/L (ref 39–117)
ALT SERPL W P-5'-P-CCNC: 21 U/L (ref 1–33)
ANION GAP SERPL CALCULATED.3IONS-SCNC: 12.4 MMOL/L (ref 5–15)
AST SERPL-CCNC: 22 U/L (ref 1–32)
BILIRUB SERPL-MCNC: 0.4 MG/DL (ref 0–1.2)
BUN SERPL-MCNC: 13 MG/DL (ref 8–23)
BUN/CREAT SERPL: 14.4 (ref 7–25)
CALCIUM SPEC-SCNC: 9.5 MG/DL (ref 8.6–10.5)
CHLORIDE SERPL-SCNC: 105 MMOL/L (ref 98–107)
CHOLEST SERPL-MCNC: 252 MG/DL (ref 0–200)
CO2 SERPL-SCNC: 24.6 MMOL/L (ref 22–29)
CREAT SERPL-MCNC: 0.9 MG/DL (ref 0.57–1)
DEPRECATED RDW RBC AUTO: 45.1 FL (ref 37–54)
EGFRCR SERPLBLD CKD-EPI 2021: 72 ML/MIN/1.73
ERYTHROCYTE [DISTWIDTH] IN BLOOD BY AUTOMATED COUNT: 12.8 % (ref 12.3–15.4)
GLOBULIN UR ELPH-MCNC: 2.6 GM/DL
GLUCOSE SERPL-MCNC: 103 MG/DL (ref 65–99)
HCT VFR BLD AUTO: 44.2 % (ref 34–46.6)
HDLC SERPL-MCNC: 92 MG/DL (ref 40–60)
HGB BLD-MCNC: 15.2 G/DL (ref 12–15.9)
LDLC SERPL CALC-MCNC: 147 MG/DL (ref 0–100)
LDLC/HDLC SERPL: 1.57 {RATIO}
MCH RBC QN AUTO: 32.8 PG (ref 26.6–33)
MCHC RBC AUTO-ENTMCNC: 34.4 G/DL (ref 31.5–35.7)
MCV RBC AUTO: 95.3 FL (ref 79–97)
PLATELET # BLD AUTO: 243 10*3/MM3 (ref 140–450)
PMV BLD AUTO: 9.6 FL (ref 6–12)
POTASSIUM SERPL-SCNC: 3.7 MMOL/L (ref 3.5–5.2)
PROT SERPL-MCNC: 7.1 G/DL (ref 6–8.5)
RBC # BLD AUTO: 4.64 10*6/MM3 (ref 3.77–5.28)
SODIUM SERPL-SCNC: 142 MMOL/L (ref 136–145)
TRIGL SERPL-MCNC: 78 MG/DL (ref 0–150)
TSH SERPL DL<=0.05 MIU/L-ACNC: 2.49 UIU/ML (ref 0.27–4.2)
VLDLC SERPL-MCNC: 13 MG/DL (ref 5–40)
WBC NRBC COR # BLD: 5.22 10*3/MM3 (ref 3.4–10.8)

## 2022-09-15 PROCEDURE — 85027 COMPLETE CBC AUTOMATED: CPT | Performed by: INTERNAL MEDICINE

## 2022-09-15 PROCEDURE — 82306 VITAMIN D 25 HYDROXY: CPT | Performed by: INTERNAL MEDICINE

## 2022-09-15 PROCEDURE — 80053 COMPREHEN METABOLIC PANEL: CPT | Performed by: INTERNAL MEDICINE

## 2022-09-15 PROCEDURE — 84443 ASSAY THYROID STIM HORMONE: CPT | Performed by: INTERNAL MEDICINE

## 2022-09-15 PROCEDURE — 80061 LIPID PANEL: CPT | Performed by: INTERNAL MEDICINE

## 2022-10-26 RX ORDER — LISINOPRIL 10 MG/1
TABLET ORAL
Qty: 45 TABLET | Refills: 2 | OUTPATIENT
Start: 2022-10-26

## 2022-10-26 RX ORDER — LISINOPRIL 5 MG/1
5 TABLET ORAL DAILY
Qty: 90 TABLET | Refills: 3 | Status: SHIPPED | OUTPATIENT
Start: 2022-10-26

## 2022-10-28 ENCOUNTER — OFFICE VISIT (OUTPATIENT)
Dept: INTERNAL MEDICINE | Facility: CLINIC | Age: 62
End: 2022-10-28

## 2022-10-28 VITALS
OXYGEN SATURATION: 97 % | TEMPERATURE: 97.8 F | SYSTOLIC BLOOD PRESSURE: 118 MMHG | BODY MASS INDEX: 24.02 KG/M2 | HEART RATE: 97 BPM | WEIGHT: 128.4 LBS | DIASTOLIC BLOOD PRESSURE: 72 MMHG | RESPIRATION RATE: 18 BRPM

## 2022-10-28 DIAGNOSIS — K57.92 DIVERTICULITIS: Primary | ICD-10-CM

## 2022-10-28 PROCEDURE — 99213 OFFICE O/P EST LOW 20 MIN: CPT | Performed by: NURSE PRACTITIONER

## 2022-10-28 RX ORDER — METRONIDAZOLE 500 MG/1
500 TABLET ORAL 3 TIMES DAILY
Qty: 21 TABLET | Refills: 0 | Status: SHIPPED | OUTPATIENT
Start: 2022-10-28 | End: 2022-11-04

## 2022-10-28 RX ORDER — CIPROFLOXACIN 750 MG/1
750 TABLET, FILM COATED ORAL 2 TIMES DAILY
Qty: 14 TABLET | Refills: 0 | Status: SHIPPED | OUTPATIENT
Start: 2022-10-28 | End: 2022-11-04

## 2022-11-04 ENCOUNTER — OFFICE VISIT (OUTPATIENT)
Dept: INTERNAL MEDICINE | Facility: CLINIC | Age: 62
End: 2022-11-04

## 2022-11-04 VITALS
BODY MASS INDEX: 23.16 KG/M2 | DIASTOLIC BLOOD PRESSURE: 78 MMHG | OXYGEN SATURATION: 99 % | WEIGHT: 123.8 LBS | HEART RATE: 82 BPM | SYSTOLIC BLOOD PRESSURE: 122 MMHG | TEMPERATURE: 97.5 F | RESPIRATION RATE: 18 BRPM

## 2022-11-04 DIAGNOSIS — K57.30 SIGMOID DIVERTICULOSIS: Primary | ICD-10-CM

## 2022-11-04 DIAGNOSIS — R05.1 ACUTE COUGH: ICD-10-CM

## 2022-11-04 PROCEDURE — 99213 OFFICE O/P EST LOW 20 MIN: CPT | Performed by: NURSE PRACTITIONER

## 2022-11-04 NOTE — PROGRESS NOTES
Annie Oseguera presents to [facility] for     Chief Complaint  Chief Complaint   Patient presents with   • Abdominal Pain     1 week f/u, pt states pain has gotten better over the last week.    • Cough     Improving         PCP:  Vesna Bangura MD    Subjective        Abdominal Pain  This is a new problem. The current episode started 1 to 4 weeks ago (1.5 weeks ago ). The onset quality is gradual. The problem occurs constantly. The problem has been resolved. The pain is located in the LLQ. The pain is at a severity of 3/10. The patient is experiencing no pain. The abdominal pain does not radiate. Associated symptoms include diarrhea. Pertinent negatives include no anorexia, arthralgias, belching, constipation (resolved), dysuria, fever, flatus, frequency, hematochezia, hematuria, melena, nausea or vomiting. Nothing aggravates the pain. The pain is relieved by nothing. She has tried antibiotics for the symptoms. The treatment provided significant relief. diverticulitis   She is following up today after what we suspected was a diverticulitis flare.  She has completed her antibiotics (has 1 dose left of metronidazole today.)  She started having some watery bowel movements but feels as though this is starting to decrease a little bit over the last day or so.  She tells me she feels much better now.  She has been drinking adequate water and even drinking some Gatorade.    Since her last visit she developed a mild cold and has a lingering cough.  She tells me she feels well and has been using Robitussin over-the-counter.  No fever, no chills, no shortness of breath and does not feel ill.    Review of Systems   Constitutional: Negative for activity change, appetite change, chills, diaphoresis, fatigue and fever.   HENT: Negative.    Respiratory: Positive for cough. Negative for chest tightness, shortness of breath and wheezing.    Gastrointestinal: Positive for abdominal pain and  diarrhea. Negative for anorexia, blood in stool, constipation (resolved), flatus, hematochezia, melena, nausea, vomiting and indigestion.   Genitourinary: Negative for dysuria, frequency and hematuria.   Musculoskeletal: Negative for arthralgias.         Allergies   Allergen Reactions   • Sulfa Antibiotics Shortness Of Breath and Rash   • Penicillins Hives     Current Outpatient Medications on File Prior to Visit   Medication Sig Dispense Refill   • Ascorbic Acid (VITAMIN C PO) Take 1,000 mg by mouth Daily. Lipisomal     • AzaSite 1 % ophthalmic solution Administer 1 drop to both eyes 2 (Two) Times a Day.     • B Complex Vitamins (vitamin b complex) capsule capsule Take 1 capsule by mouth Daily.     • cholecalciferol (VITAMIN D3) 1.25 MG (19635 UT) capsule 2,000 Units Daily.     • COLLAGEN PO Take 3 capsules by mouth Daily. Marine collagen     • Estring 2 MG vaginal ring 1 each Every 3 (Three) Months.     • lisinopril (PRINIVIL,ZESTRIL) 5 MG tablet Take 1 tablet by mouth Daily. 90 tablet 3   • MAGNESIUM PO Take 200 mg by mouth Daily.     • Methylcellulose, Laxative, (CITRUCEL PO) Take 2 tablets by mouth Every Night.     • metroNIDAZOLE (Flagyl) 500 MG tablet Take 1 tablet by mouth 3 (Three) Times a Day for 7 days. 21 tablet 0   • Omega-3 Fatty Acids (fish oil) 1000 MG capsule capsule 2,800 mg Daily.     • omeprazole (priLOSEC) 20 MG capsule Take 1 capsule by mouth Daily. 90 capsule 3   • Probiotic Product (PROBIOTIC MULTI-ENZYME PO) Take  by mouth.     • Unable to find 2 each Daily. kim     • ciprofloxacin (CIPRO) 750 MG tablet Take 1 tablet by mouth 2 (Two) Times a Day for 7 days. 14 tablet 0     No current facility-administered medications on file prior to visit.         The following portions of the patient's history were reviewed and updated as appropriate: allergies, current medications, past family history, past medical history, past social history, past surgical history and problem list and are  available for review within electronic record    Objective     Result Review :                    Vital Signs:   /78 (BP Location: Right arm, Patient Position: Sitting, Cuff Size: Adult)   Pulse 82   Temp 97.5 °F (36.4 °C) (Infrared)   Resp 18   Wt 56.2 kg (123 lb 12.8 oz)   SpO2 99%   BMI 23.16 kg/m²         Physical Exam  Constitutional:       Appearance: Normal appearance. She is well-developed.   HENT:      Head: Normocephalic and atraumatic.   Eyes:      Conjunctiva/sclera: Conjunctivae normal.   Cardiovascular:      Rate and Rhythm: Normal rate and regular rhythm.      Heart sounds: Normal heart sounds.   Pulmonary:      Effort: Pulmonary effort is normal.      Breath sounds: Normal breath sounds.   Abdominal:      General: Bowel sounds are normal.      Palpations: Abdomen is soft.      Tenderness: There is no abdominal tenderness (resolved). There is no guarding or rebound. Negative signs include Rovsing's sign.      Hernia: No hernia is present.   Musculoskeletal:         General: Normal range of motion.      Cervical back: Normal range of motion.   Skin:     General: Skin is warm and dry.   Neurological:      Mental Status: She is alert and oriented to person, place, and time.   Psychiatric:         Behavior: Behavior normal.         Thought Content: Thought content normal.         Judgment: Judgment normal.                 Assessment and Plan      Diagnoses and all orders for this visit:    1. Sigmoid diverticulosis (Primary)    2. Acute cough      She appears well and in no acute distress.  She has had a resolution in her abdominal pain.  Bowel movements are slowly decreasing and being less watery.  She will start consuming some yogurt.  She will keep diet kind of bland over the weekend.  If loose stools have not improved she will let me know on Monday we will order stool studies.  Continue to increase fluid intake with electrolyte replacement solution such as Gatorade or Pedialyte.      She  declines any testing for COVID or flu and reports that she feels like she is over her cold except for a mild lingering cough.  Can continue Robitussin over-the-counter for the cough.    Follow Up     Patient was given instructions and counseling regarding her condition or for health maintenance advice. Please see specific information pulled into the AVS if appropriate.   Any new medication's adverse effects have been discussed in detail with patient  Patient was encouraged to keep me informed of any acute changes, lack of improvement, or any new concerning symptoms.    Return if symptoms worsen or fail to improve.          Dictated Utilizing Dragon Dictation   Please note that portions of this note were completed with a voice recognition program.   Part of this note may be an electronic transcription/translation of spoken language to printed text using the Dragon Dictation System.

## 2023-02-17 ENCOUNTER — HOSPITAL ENCOUNTER (OUTPATIENT)
Dept: GENERAL RADIOLOGY | Facility: HOSPITAL | Age: 63
Discharge: HOME | End: 2023-02-17
Admitting: INTERNAL MEDICINE
Payer: COMMERCIAL

## 2023-02-17 ENCOUNTER — OFFICE VISIT (OUTPATIENT)
Dept: INTERNAL MEDICINE | Facility: CLINIC | Age: 63
End: 2023-02-17
Payer: COMMERCIAL

## 2023-02-17 VITALS
HEART RATE: 72 BPM | BODY MASS INDEX: 24.51 KG/M2 | RESPIRATION RATE: 18 BRPM | OXYGEN SATURATION: 100 % | DIASTOLIC BLOOD PRESSURE: 80 MMHG | TEMPERATURE: 97.3 F | SYSTOLIC BLOOD PRESSURE: 152 MMHG | HEIGHT: 61 IN | WEIGHT: 129.8 LBS

## 2023-02-17 DIAGNOSIS — M79.605 LEFT LEG PAIN: Primary | ICD-10-CM

## 2023-02-17 DIAGNOSIS — M79.605 LEFT LEG PAIN: ICD-10-CM

## 2023-02-17 PROCEDURE — 99213 OFFICE O/P EST LOW 20 MIN: CPT | Performed by: INTERNAL MEDICINE

## 2023-02-17 PROCEDURE — 73590 X-RAY EXAM OF LOWER LEG: CPT

## 2023-02-17 NOTE — PROGRESS NOTES
Chief Complaint  Leg Pain (Left leg. Calf, into knee. x1m)    Subjective          Annie Oseguera presents to [facility]  History of Present Illness      L lower leg pain- pain has been occurring over the last month in the lateral side below the knee. She doesn't feel it is coming from the knee itself.It bothers her more at night, though some pain going up steps. Can wake her up at night. Aching pain, 5/10 at night. No injury. No swelling. No numbness/tingling, no burning  Very minimal smoking in college, no h/o DVT (maybe superficial thrombophlebitis once in college, never had to be on anticoagulation though).  She does workout regularly and was not sure if she may be strained her leg during a workout  She did have a herniated lumbar disc in '98 and did PT w/ resolution. 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 She does feel this has worsend some in recent months, and into the entire surface more than it had been      htn - she does check at home and usually fine.she has been under stress with her mammogram and yearly oncology appointment which always makes her nervous    .  Current Outpatient Medications:   •  Ascorbic Acid (VITAMIN C PO), Take 1,000 mg by mouth Daily. Lipisomal, Disp: , Rfl:   •  B Complex Vitamins (vitamin b complex) capsule capsule, Take 1 capsule by mouth Daily., Disp: , Rfl:   •  cholecalciferol (VITAMIN D3) 1.25 MG (35155 UT) capsule, 2,000 Units Daily., Disp: , Rfl:   •  COLLAGEN PO, Take 3 capsules by mouth Daily. Marine collagen, Disp: , Rfl:   •  Estring 2 MG vaginal ring, 1 each Every 3 (Three) Months., Disp: , Rfl:   •  lisinopril (PRINIVIL,ZESTRIL) 5 MG tablet, Take 1 tablet by mouth Daily., Disp: 90 tablet, Rfl: 3  •  Methylcellulose, Laxative, (CITRUCEL PO), Take 2 tablets by mouth Every Night., Disp: , Rfl:   •  Omega-3 Fatty  "Acids (fish oil) 1000 MG capsule capsule, 2,800 mg Daily., Disp: , Rfl:   •  omeprazole (priLOSEC) 20 MG capsule, Take 1 capsule by mouth Daily., Disp: 90 capsule, Rfl: 3  •  Probiotic Product (PROBIOTIC MULTI-ENZYME PO), Take  by mouth., Disp: , Rfl:   •  AzaSite 1 % ophthalmic solution, Administer 1 drop to both eyes 2 (Two) Times a Day., Disp: , Rfl:   •  MAGNESIUM PO, Take 200 mg by mouth Daily., Disp: , Rfl:    The following portions of the patient's history were reviewed and updated as appropriate: allergies, current medications, past family history, past medical history, past social history, past surgical history and problem list.     Objective   Vital Signs:   /80   Pulse 72   Temp 97.3 °F (36.3 °C)   Resp 18   Ht 155.7 cm (61.3\")   Wt 58.9 kg (129 lb 12.8 oz)   SpO2 100%   BMI 24.29 kg/m²       Physical exam  Constitutional: oriented to person, place, and time.  well-developed and well-nourished. No distress.   HENT:   Head: Normocephalic and atraumatic.   Eyes: Conjunctivae and EOM are normal.   Neurological:  alert and oriented to person, place, and time. 2/4 B knee jerk  Skin: Skin is warm and dry. not diaphoretic. Some varicose veins on R leg, but minimal on left leg. Skin normal. No erythema  PV: pedal pulses 2+ B. No edema, no calf tenderness  MS: no tenderness in leg to palpation. Neg SLR on left. No lumbar tenderness  Psychiatric:  normal mood and affect. behavior is normal. Judgment and thought content normal.      Result Review :                   Assessment and Plan    Diagnoses and all orders for this visit:    1. Left leg pain (Primary)  -     XR tibia fibula 2 vw left; Future    Exam is normal today.  We will start with a plain x-ray.  Discussed we could consider doing ABIs and/or lumbar MRI.  We will also have her try Tylenol when she goes to bed at night to see if that helps some with the discomfort    Follow Up   No follow-ups on file.  Patient was given instructions and " counseling regarding her condition or for health maintenance advice. Please see specific information pulled into the AVS if appropriate.

## 2023-02-20 ENCOUNTER — TELEPHONE (OUTPATIENT)
Dept: INTERNAL MEDICINE | Facility: CLINIC | Age: 63
End: 2023-02-20
Payer: COMMERCIAL

## 2023-02-20 NOTE — TELEPHONE ENCOUNTER
----- Message from Vesna Bangura MD sent at 2/19/2023 12:02 PM EST -----  Can you let pt know, her xray of the leg is normal. If she can try tylenol each night before bed for the next week, and if no improvement with that if she can give us a call, and we can order a nerve conduction test next.

## 2023-02-22 ENCOUNTER — TELEPHONE (OUTPATIENT)
Dept: INTERNAL MEDICINE | Facility: CLINIC | Age: 63
End: 2023-02-22
Payer: COMMERCIAL

## 2023-02-22 NOTE — TELEPHONE ENCOUNTER
Caller: Annie Oseguera    Relationship: Self    Best call back number: [phone]    What is the best time to reach you: ANYTIME     Who are you requesting to speak with (clinical staff, provider,  specific staff member): SAMANTHA ZHONG         What was the call regarding: THE CALLER STATES THAT SHE IS HAVING A DIVERTICULITIS FLARE UP SHE WOULD LIKE TO KNOW IF THERE IS ANYTHING SHE CAN DO SO THAT SHE DOES NOT HAVE TO COME IN FOR AN APPOINTMENT THE PATIENT DID MAKE AN APPOINTMENT FOR 02/24/2023    Do you require a callback: YES

## 2023-02-22 NOTE — TELEPHONE ENCOUNTER
Patient has been notified and verbalized understanding.  She does have an appointment for Friday.  She asked about taking tylenol and Dr. Bangura said that was okay for her to take.

## 2023-02-22 NOTE — TELEPHONE ENCOUNTER
I would recommend that she follow a clear liquid diet and then can advance as tolerated.  I do think she needs to be seen

## 2023-02-24 ENCOUNTER — OFFICE VISIT (OUTPATIENT)
Dept: INTERNAL MEDICINE | Facility: CLINIC | Age: 63
End: 2023-02-24
Payer: COMMERCIAL

## 2023-02-24 VITALS
WEIGHT: 124.8 LBS | OXYGEN SATURATION: 97 % | BODY MASS INDEX: 23.56 KG/M2 | HEIGHT: 61 IN | HEART RATE: 84 BPM | RESPIRATION RATE: 18 BRPM | TEMPERATURE: 96.2 F | SYSTOLIC BLOOD PRESSURE: 126 MMHG | DIASTOLIC BLOOD PRESSURE: 70 MMHG

## 2023-02-24 DIAGNOSIS — K57.92 DIVERTICULITIS: ICD-10-CM

## 2023-02-24 DIAGNOSIS — N39.0 ACUTE UTI: ICD-10-CM

## 2023-02-24 DIAGNOSIS — R10.30 LOWER ABDOMINAL PAIN: Primary | ICD-10-CM

## 2023-02-24 LAB
BILIRUB BLD-MCNC: NEGATIVE MG/DL
CLARITY, POC: CLEAR
COLOR UR: YELLOW
EXPIRATION DATE: ABNORMAL
GLUCOSE UR STRIP-MCNC: ABNORMAL MG/DL
KETONES UR QL: ABNORMAL
LEUKOCYTE EST, POC: ABNORMAL
Lab: ABNORMAL
NITRITE UR-MCNC: POSITIVE MG/ML
PH UR: 5 [PH] (ref 5–8)
PROT UR STRIP-MCNC: ABNORMAL MG/DL
RBC # UR STRIP: NEGATIVE /UL
SP GR UR: 1.03 (ref 1–1.03)
UROBILINOGEN UR QL: ABNORMAL

## 2023-02-24 PROCEDURE — 81003 URINALYSIS AUTO W/O SCOPE: CPT | Performed by: NURSE PRACTITIONER

## 2023-02-24 PROCEDURE — 99213 OFFICE O/P EST LOW 20 MIN: CPT | Performed by: NURSE PRACTITIONER

## 2023-02-24 PROCEDURE — 87086 URINE CULTURE/COLONY COUNT: CPT | Performed by: NURSE PRACTITIONER

## 2023-02-24 RX ORDER — NITROFURANTOIN 25; 75 MG/1; MG/1
100 CAPSULE ORAL 2 TIMES DAILY
Qty: 10 CAPSULE | Refills: 0 | Status: SHIPPED | OUTPATIENT
Start: 2023-02-24 | End: 2023-03-01

## 2023-02-26 LAB — BACTERIA SPEC AEROBE CULT: NO GROWTH

## 2023-03-01 ENCOUNTER — OFFICE VISIT (OUTPATIENT)
Dept: INTERNAL MEDICINE | Facility: CLINIC | Age: 63
End: 2023-03-01
Payer: COMMERCIAL

## 2023-03-01 VITALS
HEART RATE: 96 BPM | SYSTOLIC BLOOD PRESSURE: 126 MMHG | HEIGHT: 61 IN | DIASTOLIC BLOOD PRESSURE: 70 MMHG | BODY MASS INDEX: 23.56 KG/M2 | RESPIRATION RATE: 16 BRPM | TEMPERATURE: 96.8 F | OXYGEN SATURATION: 99 % | WEIGHT: 124.8 LBS

## 2023-03-01 DIAGNOSIS — K57.92 DIVERTICULITIS: Primary | ICD-10-CM

## 2023-03-01 PROCEDURE — 99213 OFFICE O/P EST LOW 20 MIN: CPT | Performed by: NURSE PRACTITIONER

## 2023-03-01 RX ORDER — CIPROFLOXACIN 750 MG/1
750 TABLET, FILM COATED ORAL 2 TIMES DAILY
Qty: 14 TABLET | Refills: 0 | Status: SHIPPED | OUTPATIENT
Start: 2023-03-01 | End: 2023-03-08

## 2023-03-01 RX ORDER — METRONIDAZOLE 500 MG/1
500 TABLET ORAL 3 TIMES DAILY
Qty: 21 TABLET | Refills: 0 | Status: SHIPPED | OUTPATIENT
Start: 2023-03-01 | End: 2023-03-08

## 2023-03-07 ENCOUNTER — OFFICE (OUTPATIENT)
Dept: URBAN - METROPOLITAN AREA CLINIC 4 | Facility: CLINIC | Age: 63
End: 2023-03-07

## 2023-03-07 VITALS
SYSTOLIC BLOOD PRESSURE: 150 MMHG | HEIGHT: 61 IN | DIASTOLIC BLOOD PRESSURE: 90 MMHG | SYSTOLIC BLOOD PRESSURE: 146 MMHG | DIASTOLIC BLOOD PRESSURE: 94 MMHG | WEIGHT: 125 LBS

## 2023-03-07 DIAGNOSIS — K57.92 DIVERTICULITIS OF INTESTINE, PART UNSPECIFIED, WITHOUT PERFO: ICD-10-CM

## 2023-03-07 DIAGNOSIS — R10.30 LOWER ABDOMINAL PAIN, UNSPECIFIED: ICD-10-CM

## 2023-03-07 PROCEDURE — 99204 OFFICE O/P NEW MOD 45 MIN: CPT | Performed by: NURSE PRACTITIONER

## 2023-05-02 ENCOUNTER — OFFICE (OUTPATIENT)
Dept: URBAN - METROPOLITAN AREA CLINIC 4 | Facility: CLINIC | Age: 63
End: 2023-05-02

## 2023-05-02 VITALS — WEIGHT: 125 LBS | DIASTOLIC BLOOD PRESSURE: 96 MMHG | SYSTOLIC BLOOD PRESSURE: 164 MMHG | HEIGHT: 61 IN

## 2023-05-02 DIAGNOSIS — K57.92 DIVERTICULITIS OF INTESTINE, PART UNSPECIFIED, WITHOUT PERFO: ICD-10-CM

## 2023-05-02 DIAGNOSIS — F43.9 REACTION TO SEVERE STRESS, UNSPECIFIED: ICD-10-CM

## 2023-05-02 DIAGNOSIS — F10.10 ALCOHOL ABUSE, UNCOMPLICATED: ICD-10-CM

## 2023-05-02 DIAGNOSIS — R10.30 LOWER ABDOMINAL PAIN, UNSPECIFIED: ICD-10-CM

## 2023-05-02 DIAGNOSIS — K59.00 CONSTIPATION, UNSPECIFIED: ICD-10-CM

## 2023-05-02 PROCEDURE — 99214 OFFICE O/P EST MOD 30 MIN: CPT | Performed by: NURSE PRACTITIONER

## 2023-08-21 ENCOUNTER — TELEPHONE (OUTPATIENT)
Dept: INTERNAL MEDICINE | Facility: CLINIC | Age: 63
End: 2023-08-21
Payer: COMMERCIAL

## 2023-08-21 NOTE — TELEPHONE ENCOUNTER
Caller: Annie Oseguera    Relationship: Self    Best call back number: [phone]     What medications are you currently taking:   Current Outpatient Medications on File Prior to Visit   Medication Sig Dispense Refill    Ascorbic Acid (VITAMIN C PO) Take 1,000 mg by mouth Daily. Lipisomal      AzaSite 1 % ophthalmic solution Administer 1 drop to both eyes 2 (Two) Times a Day.      B Complex Vitamins (vitamin b complex) capsule capsule Take 1 capsule by mouth Daily.      cholecalciferol (VITAMIN D3) 1.25 MG (26178 UT) capsule 2,000 Units Daily.      COLLAGEN PO Take 3 capsules by mouth Daily. Marine collagen      Estring 2 MG vaginal ring Insert 2 mg into the vagina Every 3 (Three) Months. 1 each 3    lisinopril (PRINIVIL,ZESTRIL) 5 MG tablet Take 1 tablet by mouth Daily. 90 tablet 3    MAGNESIUM PO Take 200 mg by mouth Daily.      Methylcellulose, Laxative, (CITRUCEL PO) Take 2 tablets by mouth Every Night.      Omega-3 Fatty Acids (fish oil) 1000 MG capsule capsule 2,800 mg Daily.      omeprazole (priLOSEC) 20 MG capsule Take 1 capsule by mouth Daily. 90 capsule 3    Probiotic Product (PROBIOTIC MULTI-ENZYME PO) Take  by mouth.       No current facility-administered medications on file prior to visit.          When did you start taking these medications:     Which medication are you concerned about: Estring 2 MG vaginal ring    Who prescribed you this medication:     What are your concerns: PATIENT STATES SHE IS SIGNING UP FOR MEDICARE AND THEY DO NOT COVER THIS OR THE ESTRADIOL 10MCG BUT SHE WOULD LIKE TO KNOW IF DR. GOULD KNOWS OF ANYTHING ELSE THAT WOULD WORK AND WOULD BE CHEAPER WITH HER NEW INSURANCE.     How long have you had these concerns:

## 2023-08-21 NOTE — TELEPHONE ENCOUNTER
She has an appointment coming up on 8/30/23 for a physical.  Do you want to discuss at that appointment?

## 2023-08-22 NOTE — TELEPHONE ENCOUNTER
HUB to read:      Premarin and Estrace vaginal estrogen would be the other options if she wants to see if either of these would be covered.

## 2023-08-27 NOTE — PROGRESS NOTES
Here for physical    Exercise: works out w/  2d/week and walks regularly  Diet: healthy overall; on multiple supplements listed. Is using psyllium daily. She is taking a licorice root extract for reflux. 10 drinks/week     HTN - on lisinopril 5 qd. She checks BP at home and usually in the 120/80 range. In past has been high in office     GERD- she will occasionally take prilosec - does not need refill today     HRT - she is on estring currently but her insurance changes next month and the vagifem is covered, but still expensive.  She would like a written prescription for this as she may check to see where she can get the best price     L leg pain - feels this in the shin. An aching pain just at night when she lays on her side  Did injure her toes months ago, and she feels the leg pain started after that injury but she feels it is actually better here recently.  We had done a plain x-ray of her leg which was normal earlier this year.  No pain during the day, just at night. Usually if she lays on her back it will go away  Her  suggested using a pillow between her legs at night, but she has not tried this yet        Current Outpatient Medications:     Ascorbic Acid (VITAMIN C PO), Take 1,000 mg by mouth Daily. Lipisomal, Disp: , Rfl:     B Complex Vitamins (vitamin b complex) capsule capsule, Take 1 capsule by mouth Daily., Disp: , Rfl:     cholecalciferol (VITAMIN D3) 1.25 MG (36748 UT) capsule, 2,000 Units Daily., Disp: , Rfl:     COLLAGEN PO, Take 3 capsules by mouth Daily. Marine collagen, Disp: , Rfl:     lisinopril (PRINIVIL,ZESTRIL) 5 MG tablet, Take 1 tablet by mouth Daily., Disp: 90 tablet, Rfl: 3    MAGNESIUM PO, Take 200 mg by mouth Daily., Disp: , Rfl:     Omega-3 Fatty Acids (fish oil) 1000 MG capsule capsule, 2,800 mg Daily., Disp: , Rfl:     Polyethylene Glycol 3350 (MIRALAX PO), Take 1 each by mouth Daily As Needed., Disp: , Rfl:     Probiotic Product (PROBIOTIC MULTI-ENZYME PO),  "Take  by mouth., Disp: , Rfl:     Psyllium (KONSYL DAILY FIBER PO), Take 1 each by mouth Daily. One spoon daily., Disp: , Rfl:     [START ON 8/31/2023] estradiol (VAGIFEM) 10 MCG tablet vaginal tablet, Insert 1 tablet into the vagina 2 (Two) Times a Week., Disp: 24 tablet, Rfl: 3    omeprazole (priLOSEC) 20 MG capsule, Take 1 capsule by mouth Daily. (Patient not taking: Reported on 8/30/2023), Disp: 90 capsule, Rfl: 3    The following portions of the patient's history were reviewed and updated as appropriate: allergies, current medications, past family history, past medical history, past social history, past surgical history and problem list.    Review of Systems   Constitutional:  Negative for activity change, appetite change, fever, unexpected weight gain and unexpected weight loss.   HENT: Negative.     Eyes: Negative.    Respiratory:  Negative for shortness of breath and wheezing.    Cardiovascular:  Negative for chest pain, palpitations and leg swelling.   Gastrointestinal:  Positive for constipation (uses miralax occasionally) and GERD.   Endocrine: Negative.    Genitourinary:  Negative for difficulty urinating, dysuria and vaginal pain (vaginal estrogen has helped).   Musculoskeletal:  Positive for myalgias (l leg pain).   Skin: Negative.    Allergic/Immunologic: Negative for immunocompromised state.   Neurological:  Positive for numbness (L foot, under toes). Negative for seizures, speech difficulty, memory problem and confusion.   Hematological:  Does not bruise/bleed easily.   Psychiatric/Behavioral:  Negative for agitation.        Objective    /78 (BP Location: Right arm, Patient Position: Sitting)   Pulse 80   Temp 98.2 øF (36.8 øC) (Infrared)   Ht 154.4 cm (60.8\")   Wt 57.2 kg (126 lb)   SpO2 96%   BMI 23.96 kg/mý   Physical Exam   Physical Exam  Vitals and nursing note reviewed.   Constitutional:       General: She is not in acute distress.     Appearance: She is well-developed. She is not " diaphoretic.   HENT:      Head: Normocephalic and atraumatic.      Right Ear: External ear normal.      Left Ear: External ear normal.      Nose: Nose normal.      Mouth/Throat:      Pharynx: No oropharyngeal exudate.   Eyes:      General: No scleral icterus.        Right eye: No discharge.         Left eye: No discharge.      Conjunctiva/sclera: Conjunctivae normal.      Pupils: Pupils are equal, round, and reactive to light.   Neck:      Thyroid: No thyromegaly.   Cardiovascular:      Rate and Rhythm: Normal rate and regular rhythm.      Pulses: Normal pulses.      Heart sounds: Normal heart sounds. No murmur heard.    No friction rub. No gallop.   Pulmonary:      Effort: Pulmonary effort is normal. No respiratory distress.      Breath sounds: Normal breath sounds. No wheezing or rales.   Chest:   Breasts:     Right: No mass, nipple discharge, skin change or tenderness.      Left: No mass, nipple discharge, skin change or tenderness.   Abdominal:      General: Bowel sounds are normal. There is no distension.      Palpations: Abdomen is soft. There is no mass.      Tenderness: There is no abdominal tenderness. There is no guarding or rebound.   Musculoskeletal:         General: No deformity. Normal range of motion.      Cervical back: Normal range of motion and neck supple.      Right lower leg: No edema.      Left lower leg: No edema.      Comments: Pedal pulses 2+.  Negative straight leg raise on the left.  No deformity seen in the left foot or left leg   Lymphadenopathy:      Cervical: No cervical adenopathy.   Skin:     General: Skin is warm and dry.      Coloration: Skin is not pale.      Findings: No erythema or rash.   Neurological:      Mental Status: She is alert and oriented to person, place, and time.      Coordination: Coordination normal.      Deep Tendon Reflexes: Reflexes normal.   Psychiatric:         Behavior: Behavior normal.         Thought Content: Thought content normal.         Judgment:  Judgment normal.         Assessment/Plan   Diagnoses and all orders for this visit:    1. Routine general medical examination at a health care facility (Primary)  -     CBC (No Diff); Future  -     TSH Rfx On Abnormal To Free T4; Future  -     Lipid Panel; Future  -     Comprehensive Metabolic Panel; Future  -     POC Urinalysis Dipstick, Automated  Regular exercise/healthy diet. BSE q month. Sunscreen use encouraged. Check fasting labs  Eve - diagnostic due in 2/24 ([place])  Colon due 3/30 - will go to Dr Ochoa (sees BLOSSOM Sommer, at Dr Ochoa office)  DT due-  we will give TDAP today  DEXA due - we will order  Shingles- shingrix discussed- she declines today but may be willing to do in the future  Prevnar -20 - will be due later    Pap-she is s/p hysterectomy.  no longer sees gynecologist  2. Screening for osteoporosis  -     DEXA Bone Density Axial; Future    3. Need for Tdap vaccination  -     Tdap Vaccine Greater Than or Equal To 8yo IM    4. Left leg pain-pain is mild and just at night.  She will try using the pillow between her knees and see if this helps.  If pain worsens, next step would be to get a nerve conduction test.    Other orders  -     lisinopril (PRINIVIL,ZESTRIL) 5 MG tablet; Take 1 tablet by mouth Daily.  Dispense: 90 tablet; Refill: 3  -     estradiol (VAGIFEM) 10 MCG tablet vaginal tablet; Insert 1 tablet into the vagina 2 (Two) Times a Week.  Dispense: 24 tablet; Refill: 3

## 2023-08-30 ENCOUNTER — LAB (OUTPATIENT)
Dept: INTERNAL MEDICINE | Facility: CLINIC | Age: 63
End: 2023-08-30
Payer: COMMERCIAL

## 2023-08-30 ENCOUNTER — OFFICE VISIT (OUTPATIENT)
Dept: INTERNAL MEDICINE | Facility: CLINIC | Age: 63
End: 2023-08-30
Payer: COMMERCIAL

## 2023-08-30 VITALS
HEIGHT: 61 IN | OXYGEN SATURATION: 96 % | WEIGHT: 126 LBS | SYSTOLIC BLOOD PRESSURE: 124 MMHG | DIASTOLIC BLOOD PRESSURE: 78 MMHG | BODY MASS INDEX: 23.79 KG/M2 | HEART RATE: 80 BPM | TEMPERATURE: 98.2 F

## 2023-08-30 DIAGNOSIS — Z00.00 ROUTINE GENERAL MEDICAL EXAMINATION AT A HEALTH CARE FACILITY: ICD-10-CM

## 2023-08-30 DIAGNOSIS — M79.605 LEFT LEG PAIN: ICD-10-CM

## 2023-08-30 DIAGNOSIS — Z23 NEED FOR TDAP VACCINATION: ICD-10-CM

## 2023-08-30 DIAGNOSIS — Z00.00 ROUTINE GENERAL MEDICAL EXAMINATION AT A HEALTH CARE FACILITY: Primary | ICD-10-CM

## 2023-08-30 DIAGNOSIS — Z13.820 SCREENING FOR OSTEOPOROSIS: ICD-10-CM

## 2023-08-30 LAB
ALBUMIN SERPL-MCNC: 4.5 G/DL (ref 3.5–5.2)
ALBUMIN/GLOB SERPL: 1.7 G/DL
ALP SERPL-CCNC: 110 U/L (ref 39–117)
ALT SERPL-CCNC: 28 U/L (ref 1–33)
AST SERPL-CCNC: 26 U/L (ref 1–32)
BILIRUB BLD-MCNC: NEGATIVE MG/DL
BILIRUB SERPL-MCNC: 0.4 MG/DL (ref 0–1.2)
BUN SERPL-MCNC: 13 MG/DL (ref 8–23)
BUN/CREAT SERPL: 15.5 (ref 7–25)
CALCIUM SERPL-MCNC: 9.9 MG/DL (ref 8.6–10.5)
CHLORIDE SERPL-SCNC: 106 MMOL/L (ref 98–107)
CHOLEST SERPL-MCNC: 249 MG/DL (ref 0–200)
CLARITY, POC: CLEAR
CO2 SERPL-SCNC: 24.1 MMOL/L (ref 22–29)
COLOR UR: YELLOW
CREAT SERPL-MCNC: 0.84 MG/DL (ref 0.57–1)
EGFRCR SERPLBLD CKD-EPI 2021: 77.7 ML/MIN/1.73
ERYTHROCYTE [DISTWIDTH] IN BLOOD BY AUTOMATED COUNT: 13.6 % (ref 12.3–15.4)
EXPIRATION DATE: NORMAL
GLOBULIN SER CALC-MCNC: 2.7 GM/DL
GLUCOSE SERPL-MCNC: 100 MG/DL (ref 65–99)
GLUCOSE UR STRIP-MCNC: NEGATIVE MG/DL
HCT VFR BLD AUTO: 43.4 % (ref 34–46.6)
HDLC SERPL-MCNC: 95 MG/DL (ref 40–60)
HGB BLD-MCNC: 15.2 G/DL (ref 12–15.9)
KETONES UR QL: NEGATIVE
LDLC SERPL CALC-MCNC: 133 MG/DL (ref 0–100)
LEUKOCYTE EST, POC: NEGATIVE
Lab: NORMAL
MCH RBC QN AUTO: 32.9 PG (ref 26.6–33)
MCHC RBC AUTO-ENTMCNC: 35 G/DL (ref 31.5–35.7)
MCV RBC AUTO: 93.9 FL (ref 79–97)
NITRITE UR-MCNC: NEGATIVE MG/ML
PH UR: 6 [PH] (ref 5–8)
PLATELET # BLD AUTO: 258 10*3/MM3 (ref 140–450)
POTASSIUM SERPL-SCNC: 4.5 MMOL/L (ref 3.5–5.2)
PROT SERPL-MCNC: 7.2 G/DL (ref 6–8.5)
PROT UR STRIP-MCNC: NEGATIVE MG/DL
RBC # BLD AUTO: 4.62 10*6/MM3 (ref 3.77–5.28)
RBC # UR STRIP: NEGATIVE /UL
SODIUM SERPL-SCNC: 143 MMOL/L (ref 136–145)
SP GR UR: 1.01 (ref 1–1.03)
TRIGL SERPL-MCNC: 122 MG/DL (ref 0–150)
TSH SERPL DL<=0.005 MIU/L-ACNC: 2.14 UIU/ML (ref 0.27–4.2)
UROBILINOGEN UR QL: NORMAL
VLDLC SERPL CALC-MCNC: 21 MG/DL (ref 5–40)
WBC # BLD AUTO: 5.99 10*3/MM3 (ref 3.4–10.8)

## 2023-08-30 PROCEDURE — 36415 COLL VENOUS BLD VENIPUNCTURE: CPT | Performed by: INTERNAL MEDICINE

## 2023-08-30 RX ORDER — OMEPRAZOLE 20 MG/1
20 CAPSULE, DELAYED RELEASE ORAL DAILY
Qty: 90 CAPSULE | Refills: 3 | Status: CANCELLED | OUTPATIENT
Start: 2023-08-30

## 2023-08-30 RX ORDER — ESTRADIOL 10 UG/1
1 INSERT VAGINAL 2 TIMES WEEKLY
Qty: 24 TABLET | Refills: 3 | Status: SHIPPED | OUTPATIENT
Start: 2023-08-31

## 2023-08-30 RX ORDER — LISINOPRIL 5 MG/1
5 TABLET ORAL DAILY
Qty: 90 TABLET | Refills: 3 | Status: SHIPPED | OUTPATIENT
Start: 2023-08-30

## 2023-08-30 NOTE — LETTER
"VACCINE CONSENT FORM      Patient Name:  Annie Oseguera    Patient :  1960      I/We have read, or have been explained, the information about the diseases and the vaccines listed below.  There was an opportunity to ask questions and any questions were answered satisfactorily.  I/We believe that I/we understand the benefits and risks of the vaccines(s) cited, and ask the vaccine(s) listed below be given to me/us or the person named above (for which i have authorized to make the request).      Vaccine(s) give:    Orders Placed This Encounter   Procedures    Tdap Vaccine Greater Than or Equal To 8yo IM         Medicare patients:    The only vaccine covered under your medical benefit is flu/pneumonia and hepatitis B.  All other may be covered under your \"Part D\" prescription plan and requires you to go to a pharmacy with a Physician orders for administration.  If you still prefer to have it administered at our office, you will receive a bill for the vaccine and administration cost.               Patient Initials                     Patient or Parent/Guardian Signature                    Date        A copy of the appropriate Centers for Disease Control and Prevention Vaccine Information Statements has been provided.   "

## 2023-11-02 ENCOUNTER — OFFICE (OUTPATIENT)
Dept: URBAN - METROPOLITAN AREA CLINIC 4 | Facility: CLINIC | Age: 63
End: 2023-11-02

## 2023-11-02 VITALS
HEIGHT: 61 IN | SYSTOLIC BLOOD PRESSURE: 150 MMHG | DIASTOLIC BLOOD PRESSURE: 85 MMHG | SYSTOLIC BLOOD PRESSURE: 152 MMHG | DIASTOLIC BLOOD PRESSURE: 80 MMHG | WEIGHT: 129 LBS

## 2023-11-02 DIAGNOSIS — R10.30 LOWER ABDOMINAL PAIN, UNSPECIFIED: ICD-10-CM

## 2023-11-02 DIAGNOSIS — K59.00 CONSTIPATION, UNSPECIFIED: ICD-10-CM

## 2023-11-02 DIAGNOSIS — K57.92 DIVERTICULITIS OF INTESTINE, PART UNSPECIFIED, WITHOUT PERFO: ICD-10-CM

## 2023-11-02 DIAGNOSIS — K21.9 GASTRO-ESOPHAGEAL REFLUX DISEASE WITHOUT ESOPHAGITIS: ICD-10-CM

## 2023-11-02 PROCEDURE — 99214 OFFICE O/P EST MOD 30 MIN: CPT | Performed by: NURSE PRACTITIONER

## 2023-11-02 RX ORDER — FAMOTIDINE 20 MG/1
TABLET, FILM COATED ORAL
Qty: 90 | Refills: 3 | Status: ACTIVE
Start: 2023-11-02

## 2023-11-12 DIAGNOSIS — K21.9 GASTROESOPHAGEAL REFLUX DISEASE WITHOUT ESOPHAGITIS: ICD-10-CM

## 2023-11-13 RX ORDER — OMEPRAZOLE 20 MG/1
20 CAPSULE, DELAYED RELEASE ORAL DAILY
Qty: 90 CAPSULE | Refills: 0 | Status: SHIPPED | OUTPATIENT
Start: 2023-11-13

## 2024-03-04 ENCOUNTER — OFFICE (OUTPATIENT)
Dept: URBAN - METROPOLITAN AREA CLINIC 4 | Facility: CLINIC | Age: 64
End: 2024-03-04
Payer: COMMERCIAL

## 2024-03-04 VITALS — DIASTOLIC BLOOD PRESSURE: 82 MMHG | HEIGHT: 61 IN | WEIGHT: 130 LBS | SYSTOLIC BLOOD PRESSURE: 138 MMHG

## 2024-03-04 DIAGNOSIS — K59.00 CONSTIPATION, UNSPECIFIED: ICD-10-CM

## 2024-03-04 DIAGNOSIS — K21.9 GASTRO-ESOPHAGEAL REFLUX DISEASE WITHOUT ESOPHAGITIS: ICD-10-CM

## 2024-03-04 PROCEDURE — 99214 OFFICE O/P EST MOD 30 MIN: CPT | Performed by: NURSE PRACTITIONER

## 2024-03-21 ENCOUNTER — OFFICE (OUTPATIENT)
Dept: URBAN - METROPOLITAN AREA PATHOLOGY 4 | Facility: PATHOLOGY | Age: 64
End: 2024-03-21
Payer: COMMERCIAL

## 2024-03-21 ENCOUNTER — AMBULATORY SURGICAL CENTER (OUTPATIENT)
Dept: URBAN - METROPOLITAN AREA SURGERY 10 | Facility: SURGERY | Age: 64
End: 2024-03-21

## 2024-03-21 DIAGNOSIS — K21.00 GASTRO-ESOPHAGEAL REFLUX DISEASE WITH ESOPHAGITIS, WITHOUT B: ICD-10-CM

## 2024-03-21 DIAGNOSIS — K31.89 OTHER DISEASES OF STOMACH AND DUODENUM: ICD-10-CM

## 2024-03-21 DIAGNOSIS — K44.9 DIAPHRAGMATIC HERNIA WITHOUT OBSTRUCTION OR GANGRENE: ICD-10-CM

## 2024-03-21 PROCEDURE — 88305 TISSUE EXAM BY PATHOLOGIST: CPT | Performed by: PATHOLOGY

## 2024-03-21 PROCEDURE — 43239 EGD BIOPSY SINGLE/MULTIPLE: CPT | Performed by: INTERNAL MEDICINE

## 2024-04-05 ENCOUNTER — OFFICE VISIT (OUTPATIENT)
Dept: INTERNAL MEDICINE | Facility: CLINIC | Age: 64
End: 2024-04-05
Payer: MEDICARE

## 2024-04-05 VITALS
BODY MASS INDEX: 24.51 KG/M2 | RESPIRATION RATE: 18 BRPM | OXYGEN SATURATION: 97 % | TEMPERATURE: 97.5 F | HEART RATE: 72 BPM | SYSTOLIC BLOOD PRESSURE: 156 MMHG | HEIGHT: 61 IN | DIASTOLIC BLOOD PRESSURE: 86 MMHG | WEIGHT: 129.8 LBS

## 2024-04-05 DIAGNOSIS — I10 ESSENTIAL HYPERTENSION: Primary | ICD-10-CM

## 2024-04-05 DIAGNOSIS — I89.0 LYMPHEDEMA: ICD-10-CM

## 2024-04-05 DIAGNOSIS — M79.605 LEFT LEG PAIN: ICD-10-CM

## 2024-04-05 PROCEDURE — 3079F DIAST BP 80-89 MM HG: CPT | Performed by: NURSE PRACTITIONER

## 2024-04-05 PROCEDURE — 3077F SYST BP >= 140 MM HG: CPT | Performed by: NURSE PRACTITIONER

## 2024-04-05 PROCEDURE — 1160F RVW MEDS BY RX/DR IN RCRD: CPT | Performed by: NURSE PRACTITIONER

## 2024-04-05 PROCEDURE — 99214 OFFICE O/P EST MOD 30 MIN: CPT | Performed by: NURSE PRACTITIONER

## 2024-04-05 PROCEDURE — 1159F MED LIST DOCD IN RCRD: CPT | Performed by: NURSE PRACTITIONER

## 2024-04-05 RX ORDER — LISINOPRIL 20 MG/1
20 TABLET ORAL DAILY
Qty: 90 TABLET | Refills: 0 | Status: SHIPPED | OUTPATIENT
Start: 2024-04-05

## 2024-04-05 RX ORDER — OMEPRAZOLE 40 MG/1
40 CAPSULE, DELAYED RELEASE ORAL DAILY
COMMUNITY
Start: 2024-03-21

## 2024-04-05 NOTE — PROGRESS NOTES
Subjective   Annie Oseguera is a 63 y.o. female.     Chief Complaint   Patient presents with    blood pressure issues     Pt has been tracking BP at home since 3/22/24 and has noticed some elevated readings.     Leg Pain     Left is still ongoing, has had imaging in the past but still hurts.     Upper Extremity Issue     Left hand swelling, pt has had lymphnodes removed with previous Breast cancer, saw lymphedema specialist in the past, believes it is related to her wearing her watch on her left hand.        PCP: Vesna Bangura MD    History of Present Illness  The patient is a 63-year-old female who is here today to discuss some hypertension. She reports that she has been tracking blood pressure at home since around 03/22/2024 or 03/24/2024 and has noted some elevated readings. BP in office today 156/86. She is currently prescribed lisinopril 5 mg daily but has recently been taking 10 mg. Home BP's have ranged from 126/73 to 169/79.    The patient initiated a regimen of doubling her lisinopril dosage approximately 2 weeks ago, however, she has not observed any noticeable improvement in her condition. She reports no adverse effects from this medication. She undergoes a mammogram every January or February due to a history of breast cancer, which often results in anxiety. Despite a normal mammogram, her blood pressure readings were elevated during the mammogram, but home readings were within normal limits. An endoscopy on 03/21/2024 led to a recurrence of elevated blood pressure, prompting her to monitor her blood pressure. She supplemented her diet with electrolytes. Despite her elevated blood pressure, she has experienced weight loss, maintains a regular exercise regimen of 10,000 steps, and maintains a healthy diet, with a low-sodium diet. She denies experiencing chest pain, palpitations, unusual headaches, or dizziness. Her omeprazole dosage was increased to 40 mg as a therapeutic dose for 3 months due to  esophageal damage. The initial 40 mg dose resulted in nightly headaches, a common occurrence for her, leading her gastroenterologist to adjust the dosage to 220 mg, which she tolerates well. She has no history of myocardial infarction.    The patient continues to experience left leg pain, which she believes may be slightly worsening, albeit not severe. She has previously consulted with Dr. Bangura regarding this issue. She has a history of neuropathy in her toes, which she believes is related to her leg pain. Lying on her back alleviates her pain, but on either side, it induces pain.    The patient underwent lymph node removal Subsequently, she developed lymphedema in her left hand, which has become slightly more swollen than the right. She consulted a lymphedema specialist and was advised to lift weights and discontinue wearing her watch. She switched her watch to her right arm 3 days ago, which she believes may have contributed to her swelling. She has not applied any treatments to the area. She does not elevate her arm while at rest.       The following portions of the patient's history were reviewed and updated as appropriate: allergies, current medications, past family history, past medical history, past social history, past surgical history and problem list.        Review of Systems   Constitutional:  Negative for fatigue, fever and unexpected weight loss.   Eyes:  Negative for blurred vision, double vision and visual disturbance.   Respiratory:  Negative for cough, shortness of breath and wheezing.    Cardiovascular:  Negative for chest pain, palpitations and leg swelling.        Swelling to LUE   Gastrointestinal:  Negative for abdominal pain, constipation, diarrhea, nausea and vomiting.   Genitourinary:  Negative for difficulty urinating, frequency and urgency.   Musculoskeletal:  Positive for arthralgias (leg pain) and myalgias.   Skin:  Negative for color change and rash.   Neurological:  Negative for  dizziness, weakness and headache.   Hematological:  Negative for adenopathy. Does not bruise/bleed easily.           Outpatient Medications Marked as Taking for the 4/5/24 encounter (Office Visit) with YusufNirmala APRN   Medication Sig Dispense Refill    Ascorbic Acid (VITAMIN C PO) Take 1,000 mg by mouth Daily. Lipisomal      B Complex Vitamins (vitamin b complex) capsule capsule Take 1 capsule by mouth Daily.      Cholecalciferol 50 MCG (2000 UT) tablet 1 tablet Daily.      COLLAGEN PO Take 3 capsules by mouth Daily. Marine collagen      estradiol (VAGIFEM) 10 MCG tablet vaginal tablet Insert 1 tablet into the vagina 2 (Two) Times a Week. 24 tablet 3    lisinopril (PRINIVIL,ZESTRIL) 20 MG tablet Take 1 tablet by mouth Daily. 90 tablet 0    MAGNESIUM PO Take 200 mg by mouth Daily.      Omega-3 Fatty Acids (fish oil) 1000 MG capsule capsule 2,800 mg Daily.      omeprazole (priLOSEC) 40 MG capsule Take 1 capsule by mouth Daily.      Probiotic Product (PROBIOTIC MULTI-ENZYME PO) Take  by mouth.      Psyllium (KONSYL DAILY FIBER PO) Take 1 each by mouth Daily. One spoon daily.      [DISCONTINUED] lisinopril (PRINIVIL,ZESTRIL) 5 MG tablet Take 1 tablet by mouth Daily. (Patient taking differently: Take 2 tablets by mouth Daily.) 90 tablet 3     Allergies   Allergen Reactions    Sulfa Antibiotics Shortness Of Breath and Rash    Penicillins Hives           Objective   Physical Exam  Constitutional:       Appearance: Normal appearance. She is well-developed.   HENT:      Head: Normocephalic and atraumatic.   Eyes:      General: No scleral icterus.     Conjunctiva/sclera: Conjunctivae normal.   Cardiovascular:      Rate and Rhythm: Normal rate and regular rhythm.      Heart sounds: Normal heart sounds.   Pulmonary:      Effort: Pulmonary effort is normal. No respiratory distress.      Breath sounds: Normal breath sounds.   Abdominal:      General: Bowel sounds are normal.      Palpations: Abdomen is soft.       "Tenderness: There is no abdominal tenderness.   Musculoskeletal:         General: Normal range of motion.      Cervical back: Normal range of motion.      Right lower leg: No edema.      Left lower leg: No edema.   Skin:     General: Skin is warm and dry.      Comments: Nonpitting edema noted in the left arm and hand.   Neurological:      General: No focal deficit present.      Mental Status: She is alert and oriented to person, place, and time.   Psychiatric:         Attention and Perception: Attention normal.         Mood and Affect: Mood and affect normal.         Behavior: Behavior normal. Behavior is cooperative.         Thought Content: Thought content normal.         Cognition and Memory: Cognition normal.         Judgment: Judgment normal.         Vitals:    04/05/24 1422   BP: 156/86   BP Location: Right arm   Patient Position: Sitting   Cuff Size: Adult   Pulse: 72   Resp: 18   Temp: 97.5 °F (36.4 °C)   TempSrc: Infrared   SpO2: 97%   Weight: 58.9 kg (129 lb 12.8 oz)   Height: 154.4 cm (60.8\")     Body mass index is 24.69 kg/m².  Wt Readings from Last 3 Encounters:   04/05/24 58.9 kg (129 lb 12.8 oz)   08/30/23 57.2 kg (126 lb)   03/01/23 56.6 kg (124 lb 12.8 oz)             Assessment & Plan   Diagnoses and all orders for this visit:    1. Essential hypertension (Primary)  -     lisinopril (PRINIVIL,ZESTRIL) 20 MG tablet; Take 1 tablet by mouth Daily.  Dispense: 90 tablet; Refill: 0    2. Left leg pain    3. Lymphedema      1. Hypertension.  The patient's hypertension is currently uncontrolled. The dosage of Lisinopril will be increased to 20 mg, given the patient's recent 10 mg dosage has not resulted in any improvement in her blood pressure. She has been advised to monitor her blood pressure, maintain a log, and bring to her follow-up appointment. The signs and symptoms of hypotension have been discussed with the patient.    2. Lymphedema.  The patient believes her lymphedema is attributable to the use " of a watch on her left arm, which has since been moved over the right arm. The patient has been advised to monitor this condition to ascertain if it resolves the issue. Follow up if needed    3. Left leg pain.  The patient's left leg pain is ongoing. We have discussed her symptoms, which have slightly worsened since her last visit with her primary care physician, Dr. Bangura. Dr. Bangura's last recommendations included conducting a nerve conduction study. However, the patient has declined this option at this time. Should the patient decide to proceed with the nerve conduction study, she will inform us.    Follow-up  The patient is scheduled for a follow-up visit in approximately 4 weeks for reevaluation.    BMI is within normal parameters. No other follow-up for BMI required.      Return in about 4 weeks (around 5/3/2024).    I discussed my findings,recommendations, and plan of care was with the patient. They verbalized understanding and agreement.  Patient was encouraged to keep me informed of any acute changes, lack of improvement, or any new concerning symptoms.     Transcribed from ambient dictation for KAT Amaya by Lia Capone.  04/05/24   15:23 EDT    Patient or patient representative verbalized consent to the visit recording.  I have personally performed the services described in this document as transcribed by the above individual, and it is both accurate and complete.

## 2024-05-08 ENCOUNTER — OFFICE VISIT (OUTPATIENT)
Dept: INTERNAL MEDICINE | Facility: CLINIC | Age: 64
End: 2024-05-08
Payer: MEDICARE

## 2024-05-08 VITALS
HEIGHT: 61 IN | TEMPERATURE: 97.7 F | OXYGEN SATURATION: 98 % | RESPIRATION RATE: 16 BRPM | WEIGHT: 130.6 LBS | SYSTOLIC BLOOD PRESSURE: 142 MMHG | BODY MASS INDEX: 24.66 KG/M2 | DIASTOLIC BLOOD PRESSURE: 78 MMHG | HEART RATE: 72 BPM

## 2024-05-08 DIAGNOSIS — N95.1 VASOMOTOR SYMPTOMS DUE TO MENOPAUSE: ICD-10-CM

## 2024-05-08 DIAGNOSIS — Z78.0 POSTMENOPAUSAL: ICD-10-CM

## 2024-05-08 DIAGNOSIS — I10 ESSENTIAL HYPERTENSION: Primary | ICD-10-CM

## 2024-05-08 DIAGNOSIS — G47.9 SLEEP DISTURBANCE: ICD-10-CM

## 2024-05-08 PROCEDURE — 1159F MED LIST DOCD IN RCRD: CPT | Performed by: NURSE PRACTITIONER

## 2024-05-08 PROCEDURE — 1160F RVW MEDS BY RX/DR IN RCRD: CPT | Performed by: NURSE PRACTITIONER

## 2024-05-08 PROCEDURE — 1125F AMNT PAIN NOTED PAIN PRSNT: CPT | Performed by: NURSE PRACTITIONER

## 2024-05-08 PROCEDURE — 3077F SYST BP >= 140 MM HG: CPT | Performed by: NURSE PRACTITIONER

## 2024-05-08 PROCEDURE — 99214 OFFICE O/P EST MOD 30 MIN: CPT | Performed by: NURSE PRACTITIONER

## 2024-05-08 PROCEDURE — 3078F DIAST BP <80 MM HG: CPT | Performed by: NURSE PRACTITIONER

## 2024-05-08 RX ORDER — LISINOPRIL 30 MG/1
30 TABLET ORAL DAILY
Qty: 30 TABLET | Refills: 1 | Status: SHIPPED | OUTPATIENT
Start: 2024-05-08

## 2024-05-20 ENCOUNTER — TELEPHONE (OUTPATIENT)
Dept: INTERNAL MEDICINE | Facility: CLINIC | Age: 64
End: 2024-05-20
Payer: MEDICARE

## 2024-05-20 NOTE — TELEPHONE ENCOUNTER
Caller: Annie Oseguera    Relationship: Self    Best call back number: [phone]         What was the call regarding:     lisinopril (PRINIVIL,ZESTRIL) 30 MG tablet   MONITORED FOR 12 DAYS:  AVERAGE READING FOR L /73  R /73

## 2024-05-28 ENCOUNTER — HOSPITAL ENCOUNTER (OUTPATIENT)
Dept: BONE DENSITY | Facility: HOSPITAL | Age: 64
Discharge: HOME | End: 2024-05-28
Admitting: NURSE PRACTITIONER
Payer: MEDICARE

## 2024-05-28 DIAGNOSIS — Z78.0 POSTMENOPAUSAL: ICD-10-CM

## 2024-05-28 PROCEDURE — 77080 DXA BONE DENSITY AXIAL: CPT

## 2024-06-04 ENCOUNTER — TELEPHONE (OUTPATIENT)
Dept: INTERNAL MEDICINE | Facility: CLINIC | Age: 64
End: 2024-06-04
Payer: MEDICARE

## 2024-06-04 PROBLEM — M81.0 AGE-RELATED OSTEOPOROSIS WITHOUT CURRENT PATHOLOGICAL FRACTURE: Status: ACTIVE | Noted: 2024-06-04

## 2024-06-04 NOTE — TELEPHONE ENCOUNTER
----- Message from Nirmala Goldberg sent at 6/4/2024 12:25 PM EDT -----  Please let her know that her bone density screening did show osteoporosis.  We can consider a couple of treatment options    1. alcium 1200 mg/day with vitamin D 1000 units/day as well as daily weightbearing exercises like walking  or  2.  A1c 8 medication such as Fosamax    Please let me know if he has a preference

## 2024-06-04 NOTE — TELEPHONE ENCOUNTER
Spoke with pt and she states that she is needing a letter of medical necessity for her gym membership. She states that she has to now pay taxes on the amount she was previously paying and it would help cut the cost of this down if she could have a letter stating that it would be beneficial for her health if she continued exercising.

## 2024-06-04 NOTE — TELEPHONE ENCOUNTER
Spoke with pt and gave her results. She states that she currently takes 125mcg of vitamin D3 and 100mcg of K2 to help it absorb. She states that she works out several times a week with a . She is not willing to take another medication right now.

## 2024-06-27 RX ORDER — OMEPRAZOLE 40 MG/1
40 CAPSULE, DELAYED RELEASE ORAL DAILY
Qty: 90 CAPSULE | Refills: 1 | Status: SHIPPED | OUTPATIENT
Start: 2024-06-27 | End: 2024-06-28 | Stop reason: ALTCHOICE

## 2024-06-27 NOTE — TELEPHONE ENCOUNTER
Caller: Annie Oseguera    Relationship: Self    Best call back number: [phone]    Requested Prescriptions:   Requested Prescriptions     Pending Prescriptions Disp Refills    omeprazole (priLOSEC) 40 MG capsule       Sig: Take 1 capsule by mouth Daily.        Pharmacy where request should be sent:  [facility] [id number] [facility] [address] - [phone]  - [phone]        Last office visit with prescribing clinician: 8/30/2023   Last telemedicine visit with prescribing clinician: Visit date not found   Next office visit with prescribing clinician: 8/7/2024         Does the patient have less than a 3 day supply:  [x] Yes  [] No    Would you like a call back once the refill request has been completed: [] Yes [x] No    If the office needs to give you a call back, can they leave a voicemail: [] Yes [x] No    Swati Nichole   06/27/24 14:06 EDT

## 2024-06-27 NOTE — TELEPHONE ENCOUNTER
Rx Refill Note  Requested Prescriptions     Pending Prescriptions Disp Refills    omeprazole (priLOSEC) 40 MG capsule       Sig: Take 1 capsule by mouth Daily.      Last office visit with prescribing clinician: 8/30/2023   Last telemedicine visit with prescribing clinician: Visit date not found   Next office visit with prescribing clinician: 8/7/2024     Velvet Whyte MA  06/27/24, 14:17 EDT

## 2024-06-28 RX ORDER — OMEPRAZOLE 20 MG/1
20 CAPSULE, DELAYED RELEASE ORAL DAILY
Qty: 90 CAPSULE | Refills: 3 | Status: SHIPPED | OUTPATIENT
Start: 2024-06-28

## 2024-06-28 NOTE — TELEPHONE ENCOUNTER
Patient only took the 40 mg for 3 months per gastroenterology.    Needs script to go back to omeprazole 20 mg.  Please send to the pharmacy today patient has taken her last one.

## 2024-06-28 NOTE — TELEPHONE ENCOUNTER
Caller: Annie Oseguera    Relationship: Self    Best call back number:  [phone]    Which medication are you concerned about: omeprazole (priLOSEC) 40 MG capsule     Who prescribed you this medication: DR GOULD    When did you start taking this medication: ONGOING    What are your concerns: PATIENT ADVISED THIS WAS ONLY TO BE 20 MG AND SHE IS CURRENTLY AT THE PHARMACY      [facility] [id number] [place] [id number] - [phone]  - [phone]  [phone]

## 2024-07-02 DIAGNOSIS — I10 ESSENTIAL HYPERTENSION: ICD-10-CM

## 2024-07-02 RX ORDER — LISINOPRIL 30 MG/1
30 TABLET ORAL DAILY
Qty: 30 TABLET | Refills: 1 | Status: SHIPPED | OUTPATIENT
Start: 2024-07-02

## 2024-07-02 NOTE — TELEPHONE ENCOUNTER
Rx Refill Note  Requested Prescriptions     Pending Prescriptions Disp Refills    lisinopril (PRINIVIL,ZESTRIL) 30 MG tablet [Pharmacy Med Name: LISINOPRIL 30 MG TABLET] 30 tablet 1     Sig: TAKE 1 TABLET BY MOUTH DAILY      Last office visit with prescribing clinician: 5/8/2024   Last telemedicine visit with prescribing clinician: Visit date not found   Next office visit with prescribing clinician: 08/07/2024      Eliza Briggs MA  07/02/24, 16:22 EDT

## 2024-07-31 DIAGNOSIS — I10 ESSENTIAL HYPERTENSION: ICD-10-CM

## 2024-07-31 RX ORDER — LISINOPRIL 30 MG/1
30 TABLET ORAL DAILY
Qty: 30 TABLET | Refills: 0 | Status: SHIPPED | OUTPATIENT
Start: 2024-07-31

## 2024-07-31 NOTE — TELEPHONE ENCOUNTER
Rx Refill Note  Requested Prescriptions     Pending Prescriptions Disp Refills    lisinopril (PRINIVIL,ZESTRIL) 30 MG tablet 30 tablet 1     Sig: Take 1 tablet by mouth Daily.      Last office visit with prescribing clinician: 8/30/2023  Last Visit in office 08/05/2024  Last telemedicine visit with prescribing clinician: Visit date not found   Next office visit with prescribing clinician: 8/7/2024       Velvet Whyte MA  07/31/24, 08:18 EDT

## 2024-08-07 ENCOUNTER — OFFICE VISIT (OUTPATIENT)
Dept: INTERNAL MEDICINE | Facility: CLINIC | Age: 64
End: 2024-08-07
Payer: MEDICARE

## 2024-08-07 VITALS
DIASTOLIC BLOOD PRESSURE: 82 MMHG | BODY MASS INDEX: 24.58 KG/M2 | HEART RATE: 78 BPM | HEIGHT: 61 IN | RESPIRATION RATE: 18 BRPM | TEMPERATURE: 98 F | WEIGHT: 130.2 LBS | OXYGEN SATURATION: 96 % | SYSTOLIC BLOOD PRESSURE: 140 MMHG

## 2024-08-07 DIAGNOSIS — M81.0 OSTEOPOROSIS OF LUMBAR SPINE: ICD-10-CM

## 2024-08-07 DIAGNOSIS — E78.00 PURE HYPERCHOLESTEROLEMIA: ICD-10-CM

## 2024-08-07 DIAGNOSIS — K21.9 GASTROESOPHAGEAL REFLUX DISEASE WITHOUT ESOPHAGITIS: ICD-10-CM

## 2024-08-07 DIAGNOSIS — I10 ESSENTIAL HYPERTENSION: ICD-10-CM

## 2024-08-07 DIAGNOSIS — E55.9 VITAMIN D DEFICIENCY: ICD-10-CM

## 2024-08-07 DIAGNOSIS — Z00.00 WELCOME TO MEDICARE PREVENTIVE VISIT: Primary | ICD-10-CM

## 2024-08-07 PROCEDURE — 3077F SYST BP >= 140 MM HG: CPT | Performed by: INTERNAL MEDICINE

## 2024-08-07 PROCEDURE — 1159F MED LIST DOCD IN RCRD: CPT | Performed by: INTERNAL MEDICINE

## 2024-08-07 PROCEDURE — 1170F FXNL STATUS ASSESSED: CPT | Performed by: INTERNAL MEDICINE

## 2024-08-07 PROCEDURE — G0402 INITIAL PREVENTIVE EXAM: HCPCS | Performed by: INTERNAL MEDICINE

## 2024-08-07 PROCEDURE — 1160F RVW MEDS BY RX/DR IN RCRD: CPT | Performed by: INTERNAL MEDICINE

## 2024-08-07 PROCEDURE — 1126F AMNT PAIN NOTED NONE PRSNT: CPT | Performed by: INTERNAL MEDICINE

## 2024-08-07 PROCEDURE — 3079F DIAST BP 80-89 MM HG: CPT | Performed by: INTERNAL MEDICINE

## 2024-08-07 RX ORDER — LISINOPRIL 30 MG/1
30 TABLET ORAL DAILY
Qty: 90 TABLET | Refills: 3 | Status: SHIPPED | OUTPATIENT
Start: 2024-08-07

## 2024-08-07 NOTE — PROGRESS NOTES
Subjective   The ABCs of the Annual Wellness Visit  Medicare Wellness Visit      Annie Oseguera is a 63 y.o. patient who presents for a Medicare Wellness Visit.    The following portions of the patient's history were reviewed and   updated as appropriate: allergies, current medications, past family history, past medical history, past social history, past surgical history, and problem list.    Compared to one year ago, the patient's physical   health is the same.  Compared to one year ago, the patient's mental   health is the same.    Recent Hospitalizations:  She was not admitted to the hospital during the last year.     Current Medical Providers:  Patient Care Team:  Vesna Bangura MD as PCP - General (Internal Medicine)  Dr. Cierra Rosario (Optometry)  Kacey Wilks APRN (Gastroenterology)    Outpatient Medications Prior to Visit   Medication Sig Dispense Refill    Ascorbic Acid (VITAMIN C PO) Take 1,000 mg by mouth Daily. Lipisomal      B Complex Vitamins (vitamin b complex) capsule capsule Take 1 capsule by mouth Daily.      Cholecalciferol 50 MCG (2000 UT) tablet 1 tablet Daily.      COLLAGEN PO Take 3 capsules by mouth Daily. Marine collagen      estradiol (VAGIFEM) 10 MCG tablet vaginal tablet Insert 1 tablet into the vagina 2 (Two) Times a Week. 24 tablet 3    lisinopril (PRINIVIL,ZESTRIL) 30 MG tablet Take 1 tablet by mouth Daily. 30 tablet 0    MAGNESIUM PO Take 200 mg by mouth Daily.      Omega-3 Fatty Acids (fish oil) 1000 MG capsule capsule 2,800 mg Daily.      omeprazole (priLOSEC) 20 MG capsule Take 1 capsule by mouth Daily. Indications: Gastroesophageal Reflux Disease 90 capsule 3    Psyllium (KONSYL DAILY FIBER PO) Take 1 each by mouth Daily. One spoon daily.      Polyethylene Glycol 3350 (MIRALAX PO) Take 1 each by mouth Daily As Needed. (Patient not taking: Reported on 8/7/2024)      Probiotic Product (PROBIOTIC MULTI-ENZYME PO) Take  by mouth. (Patient not taking: Reported on 8/7/2024)    "    No facility-administered medications prior to visit.     No opioid medication identified on active medication list. I have reviewed chart for other potential  high risk medication/s and harmful drug interactions in the elderly.      Aspirin is not on active medication list.  Aspirin use is not indicated based on review of current medical condition/s. Risk of harm outweighs potential benefits.  .    Patient Active Problem List   Diagnosis    Urinary hesitancy    Malignant neoplasm of breast in female, estrogen receptor negative    Essential hypertension    Sigmoid diverticulosis    Left leg pain    Lymphedema    Age-related osteoporosis without current pathological fracture     Advance Care Planning Advance Directive is not on file.  ACP discussion was held with the patient during this visit. Patient has an advance directive (not in EMR), copy requested.            Objective   Vitals:    24 0922   BP: 140/82   BP Location: Right arm   Patient Position: Sitting   Cuff Size: Adult   Pulse: 78   Resp: 18   Temp: 98 °F (36.7 °C)   TempSrc: Infrared   SpO2: 96%   Weight: 59.1 kg (130 lb 3.2 oz)   Height: 155 cm (61.02\")   PainSc: 0-No pain       Estimated body mass index is 24.58 kg/m² as calculated from the following:    Height as of this encounter: 155 cm (61.02\").    Weight as of this encounter: 59.1 kg (130 lb 3.2 oz).    BMI is within normal parameters. No other follow-up for BMI required.         Gait and Balance Evaluation:  Normal  Does the patient have evidence of cognitive impairment? No                                                                                                Health  Risk Assessment    Smoking Status:  Social History     Tobacco Use   Smoking Status Former    Current packs/day: 0.00    Average packs/day: 0.5 packs/day for 1 year (0.5 ttl pk-yrs)    Types: Cigarettes    Start date:     Quit date:     Years since quittin.6   Smokeless Tobacco Never   Tobacco Comments    " only one year in college     Alcohol Consumption:  Social History     Substance and Sexual Activity   Alcohol Use Yes    Alcohol/week: 10.0 standard drinks of alcohol    Types: 10 Glasses of wine per week    Comment: 2 nightly       Fall Risk Screen  STEADI Fall Risk Assessment was completed, and patient is at LOW risk for falls.Assessment completed on:2024    Depression Screenin/7/2024     9:23 AM   PHQ-2/PHQ-9 Depression Screening   Little Interest or Pleasure in Doing Things 0-->not at all   Feeling Down, Depressed or Hopeless 0-->not at all   PHQ-9: Brief Depression Severity Measure Score 0     Health Habits and Functional and Cognitive Screenin/7/2024     9:22 AM   Functional & Cognitive Status   Do you have difficulty preparing food and eating? No   Do you have difficulty bathing yourself, getting dressed or grooming yourself? No   Do you have difficulty using the toilet? No   Do you have difficulty moving around from place to place? No   Do you have trouble with steps or getting out of a bed or a chair? No   Current Diet Well Balanced Diet   Dental Exam Up to date   Eye Exam Up to date   Exercise (times per week) 3 times per week   Current Exercises Include Weightlifting;Walking   Do you need help using the phone?  No   Are you deaf or do you have serious difficulty hearing?  No   Do you need help to go to places out of walking distance? No   Do you need help shopping? No   Do you need help preparing meals?  No   Do you need help with housework?  No   Do you need help with laundry? No   Do you need help taking your medications? No   Do you need help managing money? No   Do you ever drive or ride in a car without wearing a seat belt? No   Have you felt unusual stress, anger or loneliness in the last month? No   Who do you live with? Spouse   If you need help, do you have trouble finding someone available to you? No   Have you been bothered in the last four weeks by sexual problems? No    Do you have difficulty concentrating, remembering or making decisions? No   Visual Acuity:  Vision Screening    Right eye Left eye Both eyes   Without correction      With correction 20/25 20/50 20/25     Age-appropriate Screening Schedule:  Refer to the list below for future screening recommendations based on patient's age, sex and/or medical conditions. Orders for these recommended tests are listed in the plan section. The patient has been provided with a written plan.    Health Maintenance List  Health Maintenance   Topic Date Due    ZOSTER VACCINE (1 of 2) Never done    ANNUAL WELLNESS VISIT  Never done    PAP SMEAR  Never done    COVID-19 Vaccine (6 - 2023-24 season) 09/01/2023    Pneumococcal Vaccine 65+ (1 of 1 - PCV) Never done    INFLUENZA VACCINE  08/01/2024    MAMMOGRAM  02/21/2025    DXA SCAN  05/28/2026    COLORECTAL CANCER SCREENING  03/05/2030    TDAP/TD VACCINES (2 - Td or Tdap) 08/30/2033    HEPATITIS C SCREENING  Completed                                                                                                                                                CMS Preventative Services Quick Reference  Risk Factors Identified During Encounter  Alcohol Misuse: Patient encouraged to limit alcohol use to no more than 1 standard alcoholic beverage per day. (12 ounce beer, 6 ounce wine, one shot liquor)    The above risks/problems have been discussed with the patient.  Pertinent information has been shared with the patient in the After Visit Summary.  An After Visit Summary and PPPS were made available to the patient.    Follow Up:   Next Medicare Wellness visit to be scheduled in 1 year.         Additional E&M Note during same encounter follows:  Patient has additional, significant, and separately identifiable condition(s)/problem(s) that require work above and beyond the Medicare Wellness Visit     Chief Complaint  Welcome To Medicare    Subjective   HPI  Annie is also being seen today for  "additional medical problem/s.    Review of Systems   Constitutional:  Negative for activity change, appetite change, fatigue and fever.   Respiratory:  Negative for shortness of breath.    Cardiovascular:  Negative for chest pain and leg swelling.   Endocrine: Positive for heat intolerance (night sweats).   Allergic/Immunologic: Negative for immunocompromised state.   Neurological:  Negative for seizures, syncope, speech difficulty, weakness and confusion.   Psychiatric/Behavioral:  Positive for sleep disturbance.         Exercise: works out w/  2d/week and walks regularly  Diet: healthy overall; D supplement; she eats nelly, cottage;collagen,  off the probiotic, on Mg, D, konsyl, fish oil  8-10 drinks/week     HTN - on lisinopril 30 mg qd. She checks BP at home 1-2x/week  and usually in the 110-120/70s-80 range. In past has been high in office     Osteoporosis - in lumbar spine on recent dexa, she declined prescription medication but is trying to increase her exercise    GERD- she did have endoscopy in 3/24 with Dr. Ochoa which showed moderately severe reflux on biopsies. She took prilosec 40 qd x 3months, now on 20mg daily.  This seems to control her symptoms but she would like to decrease the use.  She did not have any Edmonds's esophagitis.  She had never had an endoscopy prior to this year     HRT - she is on  vagifem.  She will see [facility]  later this year to discuss HRT.  She has not really wanted to go on HRT but I have not felt comfortable since she does have history of breast cancer.  Does have some hot flashes at night and in general poor sleep.  Melatonin has not helped.  He also feels like her dry eyes are menopause related and then has the new diagnosis of osteoporosis           Objective   Vital Signs:  /82 (BP Location: Right arm, Patient Position: Sitting, Cuff Size: Adult)   Pulse 78   Temp 98 °F (36.7 °C) (Infrared)   Resp 18   Ht 155 cm (61.02\")   Wt 59.1 kg " (130 lb 3.2 oz)   SpO2 96%   BMI 24.58 kg/m²   Physical Exam  Vitals and nursing note reviewed.   Constitutional:       General: She is not in acute distress.     Appearance: She is well-developed. She is not diaphoretic.   HENT:      Head: Normocephalic and atraumatic.      Right Ear: External ear normal.      Left Ear: External ear normal.      Nose: Nose normal.      Mouth/Throat:      Pharynx: No oropharyngeal exudate.   Eyes:      General: No scleral icterus.        Right eye: No discharge.         Left eye: No discharge.      Conjunctiva/sclera: Conjunctivae normal.      Pupils: Pupils are equal, round, and reactive to light.   Neck:      Thyroid: No thyromegaly.   Cardiovascular:      Rate and Rhythm: Normal rate and regular rhythm.      Heart sounds: Normal heart sounds. No murmur heard.     No friction rub. No gallop.   Pulmonary:      Effort: Pulmonary effort is normal. No respiratory distress.      Breath sounds: Normal breath sounds. No wheezing or rales.   Abdominal:      General: Bowel sounds are normal. There is no distension.      Palpations: Abdomen is soft. There is no mass.      Tenderness: There is no abdominal tenderness. There is no guarding or rebound.   Musculoskeletal:         General: No deformity. Normal range of motion.      Cervical back: Normal range of motion and neck supple.   Lymphadenopathy:      Cervical: No cervical adenopathy.   Skin:     General: Skin is warm and dry.      Coloration: Skin is not pale.      Findings: No erythema or rash.   Neurological:      Mental Status: She is alert and oriented to person, place, and time.      Coordination: Coordination normal.      Deep Tendon Reflexes: Reflexes normal.   Psychiatric:         Behavior: Behavior normal.         Thought Content: Thought content normal.         Judgment: Judgment normal.         The following data was reviewed by: Vesna Bangura MD on 08/07/2024: EGD biopsy report        Assessment and Plan              Welcome to Medicare preventive visit  Regular exercise/healthy diet/sunscreen use encouraged/breast self-exam monthly  living will- she has and will bring  Eve -due 2/25  Colon due '25 (Dr Patel) pt probably will switch to Ba, who her  saw  DT due 8/33  DEXA due in '26 (osteoporosis in'24)  Shingles- shingrix discussed -  can check at pharmacy   RSV vaccine- discussed   Prevnar 20 - she declines  Pap-she is s/p hysterectomy.   HRT-discussed again with patient that I do not feel comfortable prescribing systemic hormones to someone who has had breast cancer but she has upcoming appointment with  gynecology.  We did discuss nonhormonal alternatives for menopausal symptoms such as Effexor and gabapentin but she declines these  Alcohol use-encouraged her to cut back on alcohol to no more than 7 a week  Essential hypertension  Hypertension is stable and controlled  Continue current treatment regimen.  Blood pressure will be reassessed in 1 year.  Osteoporosis of lumbar spine  We discussed her bone density but she declines prescription medication.  She takes vitamin D and we reviewed calcium intake and I gave her the calcium handout to make sure she gets 1200 mg a day  Vitamin D deficiency  We will recheck her vitamin D level today since level was high normal when we checked 2 years ago  Pure hypercholesterolemia     Gastroesophageal reflux disease without esophagitis    No orders of the defined types were placed in this encounter.  We did discuss she could gradually try to wean the Prilosec and see how she does with less frequent dosing but if she does have recurrence of GERD it would be safer to take more regularly.  Dr. Ochoa this has closed and if we need to do another endoscopy we can refer to Dr. Harden          Follow Up   No follow-ups on file.  Patient was given instructions and counseling regarding her condition or for health maintenance advice. Please see specific information pulled into  the AVS if appropriate.

## 2024-08-13 ENCOUNTER — LAB (OUTPATIENT)
Dept: INTERNAL MEDICINE | Facility: CLINIC | Age: 64
End: 2024-08-13
Payer: MEDICARE

## 2024-08-13 DIAGNOSIS — I10 ESSENTIAL HYPERTENSION: ICD-10-CM

## 2024-08-13 DIAGNOSIS — E78.00 PURE HYPERCHOLESTEROLEMIA: ICD-10-CM

## 2024-08-13 DIAGNOSIS — E55.9 VITAMIN D DEFICIENCY: ICD-10-CM

## 2024-08-13 LAB
25(OH)D3 SERPL-MCNC: 92 NG/ML (ref 30–100)
ALBUMIN SERPL-MCNC: 4.3 G/DL (ref 3.5–5.2)
ALBUMIN/GLOB SERPL: 1.8 G/DL
ALP SERPL-CCNC: 95 U/L (ref 39–117)
ALT SERPL W P-5'-P-CCNC: 23 U/L (ref 1–33)
ANION GAP SERPL CALCULATED.3IONS-SCNC: 13.3 MMOL/L (ref 5–15)
AST SERPL-CCNC: 25 U/L (ref 1–32)
BILIRUB SERPL-MCNC: 0.4 MG/DL (ref 0–1.2)
BUN SERPL-MCNC: 14 MG/DL (ref 8–23)
BUN/CREAT SERPL: 16.5 (ref 7–25)
CALCIUM SPEC-SCNC: 9.5 MG/DL (ref 8.6–10.5)
CHLORIDE SERPL-SCNC: 105 MMOL/L (ref 98–107)
CHOLEST SERPL-MCNC: 246 MG/DL (ref 0–200)
CO2 SERPL-SCNC: 22.7 MMOL/L (ref 22–29)
CREAT SERPL-MCNC: 0.85 MG/DL (ref 0.57–1)
DEPRECATED RDW RBC AUTO: 43.8 FL (ref 37–54)
EGFRCR SERPLBLD CKD-EPI 2021: 76.1 ML/MIN/1.73
ERYTHROCYTE [DISTWIDTH] IN BLOOD BY AUTOMATED COUNT: 12.7 % (ref 12.3–15.4)
GLOBULIN UR ELPH-MCNC: 2.4 GM/DL
GLUCOSE SERPL-MCNC: 104 MG/DL (ref 65–99)
HCT VFR BLD AUTO: 41.4 % (ref 34–46.6)
HDLC SERPL-MCNC: 83 MG/DL (ref 40–60)
HGB BLD-MCNC: 14 G/DL (ref 12–15.9)
LDLC SERPL CALC-MCNC: 148 MG/DL (ref 0–100)
LDLC/HDLC SERPL: 1.75 {RATIO}
MCH RBC QN AUTO: 32 PG (ref 26.6–33)
MCHC RBC AUTO-ENTMCNC: 33.8 G/DL (ref 31.5–35.7)
MCV RBC AUTO: 94.7 FL (ref 79–97)
PLATELET # BLD AUTO: 278 10*3/MM3 (ref 140–450)
PMV BLD AUTO: 9.3 FL (ref 6–12)
POTASSIUM SERPL-SCNC: 4.3 MMOL/L (ref 3.5–5.2)
PROT SERPL-MCNC: 6.7 G/DL (ref 6–8.5)
RBC # BLD AUTO: 4.37 10*6/MM3 (ref 3.77–5.28)
SODIUM SERPL-SCNC: 141 MMOL/L (ref 136–145)
TRIGL SERPL-MCNC: 87 MG/DL (ref 0–150)
TSH SERPL DL<=0.05 MIU/L-ACNC: 3.42 UIU/ML (ref 0.27–4.2)
VLDLC SERPL-MCNC: 15 MG/DL (ref 5–40)
WBC NRBC COR # BLD AUTO: 6.07 10*3/MM3 (ref 3.4–10.8)

## 2024-08-13 PROCEDURE — 84443 ASSAY THYROID STIM HORMONE: CPT | Performed by: INTERNAL MEDICINE

## 2024-08-13 PROCEDURE — 80053 COMPREHEN METABOLIC PANEL: CPT | Performed by: INTERNAL MEDICINE

## 2024-08-13 PROCEDURE — 85027 COMPLETE CBC AUTOMATED: CPT | Performed by: INTERNAL MEDICINE

## 2024-08-13 PROCEDURE — 80061 LIPID PANEL: CPT | Performed by: INTERNAL MEDICINE

## 2024-08-13 PROCEDURE — 36415 COLL VENOUS BLD VENIPUNCTURE: CPT | Performed by: INTERNAL MEDICINE

## 2024-08-13 PROCEDURE — 82306 VITAMIN D 25 HYDROXY: CPT | Performed by: INTERNAL MEDICINE

## 2024-10-03 RX ORDER — ESTRADIOL 10 UG/1
INSERT VAGINAL
Qty: 24 TABLET | Refills: 3 | Status: SHIPPED | OUTPATIENT
Start: 2024-10-03

## 2024-10-03 NOTE — TELEPHONE ENCOUNTER
Rx Refill Note  Requested Prescriptions     Pending Prescriptions Disp Refills    estradiol (VAGIFEM) 10 MCG tablet vaginal tablet [Pharmacy Med Name: ESTRADIOL 10 MCG VAGINAL TAB] 24 tablet 3     Sig: INSERT 1 TABLET INTO THE VAGINA TWO TIMES PER WEEK      Last office visit with prescribing clinician: 8/7/2024   Last telemedicine visit with prescribing clinician: Visit date not found   Next office visit with prescribing clinician: 8/14/2025     Eliza Briggs MA  10/03/24, 08:49 EDT

## 2025-07-02 RX ORDER — OMEPRAZOLE 20 MG/1
20 CAPSULE, DELAYED RELEASE ORAL DAILY
Qty: 90 CAPSULE | Refills: 3 | Status: SHIPPED | OUTPATIENT
Start: 2025-07-02